# Patient Record
Sex: MALE | Employment: FULL TIME | URBAN - METROPOLITAN AREA
[De-identification: names, ages, dates, MRNs, and addresses within clinical notes are randomized per-mention and may not be internally consistent; named-entity substitution may affect disease eponyms.]

---

## 2023-05-03 ENCOUNTER — HOSPITAL ENCOUNTER (OUTPATIENT)
Dept: GENERAL RADIOLOGY | Facility: HOSPITAL | Age: 43
Discharge: HOME | End: 2023-05-03
Attending: FAMILY MEDICINE
Payer: MEDICAID

## 2023-05-03 ENCOUNTER — LAB ENCOUNTER (OUTPATIENT)
Dept: LAB | Age: 43
End: 2023-05-03
Attending: FAMILY MEDICINE
Payer: MEDICAID

## 2023-05-03 ENCOUNTER — OFFICE VISIT (OUTPATIENT)
Dept: FAMILY MEDICINE CLINIC | Facility: CLINIC | Age: 43
End: 2023-05-03

## 2023-05-03 VITALS
BODY MASS INDEX: 42.1 KG/M2 | WEIGHT: 281 LBS | HEART RATE: 76 BPM | HEIGHT: 68.5 IN | DIASTOLIC BLOOD PRESSURE: 110 MMHG | SYSTOLIC BLOOD PRESSURE: 170 MMHG

## 2023-05-03 DIAGNOSIS — I10 BENIGN ESSENTIAL HTN: Primary | ICD-10-CM

## 2023-05-03 DIAGNOSIS — M25.562 LEFT MEDIAL KNEE PAIN: ICD-10-CM

## 2023-05-03 DIAGNOSIS — Z13.1 DIABETES MELLITUS SCREENING: ICD-10-CM

## 2023-05-03 DIAGNOSIS — I10 BENIGN ESSENTIAL HTN: ICD-10-CM

## 2023-05-03 DIAGNOSIS — Z13.220 LIPID SCREENING: ICD-10-CM

## 2023-05-03 LAB
ALBUMIN SERPL-MCNC: 4 G/DL (ref 3.4–5)
ALBUMIN/GLOB SERPL: 0.9 {RATIO} (ref 1–2)
ALP LIVER SERPL-CCNC: 124 U/L
ALT SERPL-CCNC: 80 U/L
ANION GAP SERPL CALC-SCNC: 6 MMOL/L (ref 0–18)
AST SERPL-CCNC: 42 U/L (ref 15–37)
ATRIAL RATE: 78 BPM
BILIRUB SERPL-MCNC: 0.6 MG/DL (ref 0.1–2)
BUN BLD-MCNC: 10 MG/DL (ref 7–18)
BUN/CREAT SERPL: 13.3 (ref 10–20)
CALCIUM BLD-MCNC: 9 MG/DL (ref 8.5–10.1)
CHLORIDE SERPL-SCNC: 105 MMOL/L (ref 98–112)
CHOLEST SERPL-MCNC: 211 MG/DL (ref ?–200)
CO2 SERPL-SCNC: 28 MMOL/L (ref 21–32)
CREAT BLD-MCNC: 0.75 MG/DL
EST. AVERAGE GLUCOSE BLD GHB EST-MCNC: 123 MG/DL (ref 68–126)
FASTING PATIENT LIPID ANSWER: YES
FASTING STATUS PATIENT QL REPORTED: YES
GFR SERPLBLD BASED ON 1.73 SQ M-ARVRAT: 117 ML/MIN/1.73M2 (ref 60–?)
GLOBULIN PLAS-MCNC: 4.4 G/DL (ref 2.8–4.4)
GLUCOSE BLD-MCNC: 109 MG/DL (ref 70–99)
HBA1C MFR BLD: 5.9 % (ref ?–5.7)
HDLC SERPL-MCNC: 34 MG/DL (ref 40–59)
LDLC SERPL CALC-MCNC: 139 MG/DL (ref ?–100)
NONHDLC SERPL-MCNC: 177 MG/DL (ref ?–130)
OSMOLALITY SERPL CALC.SUM OF ELEC: 288 MOSM/KG (ref 275–295)
P AXIS: 18 DEGREES
P-R INTERVAL: 164 MS
POTASSIUM SERPL-SCNC: 3.6 MMOL/L (ref 3.5–5.1)
PROT SERPL-MCNC: 8.4 G/DL (ref 6.4–8.2)
Q-T INTERVAL: 368 MS
QRS DURATION: 92 MS
QTC CALCULATION (BEZET): 419 MS
R AXIS: 11 DEGREES
SODIUM SERPL-SCNC: 139 MMOL/L (ref 136–145)
T AXIS: 21 DEGREES
TRIGL SERPL-MCNC: 209 MG/DL (ref 30–149)
TSI SER-ACNC: 2.4 MIU/ML (ref 0.36–3.74)
VENTRICULAR RATE: 78 BPM
VLDLC SERPL CALC-MCNC: 39 MG/DL (ref 0–30)

## 2023-05-03 PROCEDURE — 99204 OFFICE O/P NEW MOD 45 MIN: CPT | Performed by: FAMILY MEDICINE

## 2023-05-03 PROCEDURE — 36415 COLL VENOUS BLD VENIPUNCTURE: CPT

## 2023-05-03 PROCEDURE — 84443 ASSAY THYROID STIM HORMONE: CPT

## 2023-05-03 PROCEDURE — 93005 ELECTROCARDIOGRAM TRACING: CPT

## 2023-05-03 PROCEDURE — 3008F BODY MASS INDEX DOCD: CPT | Performed by: FAMILY MEDICINE

## 2023-05-03 PROCEDURE — 3077F SYST BP >= 140 MM HG: CPT | Performed by: FAMILY MEDICINE

## 2023-05-03 PROCEDURE — 83036 HEMOGLOBIN GLYCOSYLATED A1C: CPT

## 2023-05-03 PROCEDURE — 73562 X-RAY EXAM OF KNEE 3: CPT | Performed by: FAMILY MEDICINE

## 2023-05-03 PROCEDURE — 80053 COMPREHEN METABOLIC PANEL: CPT

## 2023-05-03 PROCEDURE — 80061 LIPID PANEL: CPT

## 2023-05-03 PROCEDURE — 93010 ELECTROCARDIOGRAM REPORT: CPT | Performed by: STUDENT IN AN ORGANIZED HEALTH CARE EDUCATION/TRAINING PROGRAM

## 2023-05-03 PROCEDURE — 3080F DIAST BP >= 90 MM HG: CPT | Performed by: FAMILY MEDICINE

## 2023-05-03 PROCEDURE — 81015 MICROSCOPIC EXAM OF URINE: CPT

## 2023-05-03 RX ORDER — LISINOPRIL 10 MG/1
10 TABLET ORAL DAILY
Qty: 90 TABLET | Refills: 3 | Status: SHIPPED | OUTPATIENT
Start: 2023-05-03 | End: 2024-04-27

## 2023-05-03 RX ORDER — BLOOD PRESSURE TEST KIT
KIT MISCELLANEOUS
Qty: 1 KIT | Refills: 0 | Status: SHIPPED | OUTPATIENT
Start: 2023-05-03

## 2023-05-03 NOTE — ASSESSMENT & PLAN NOTE
Pt new pt with no known hx of htn, but given obesity, suspected underlying cpap and elevated bps x 2 d/w pt and wife concern for htn. Pt asymptomatic. D/w them option of lifestyle modifications, checking bps at home and then f/u in 3-5days for reeval vs. Doing all of that + meds. Pt opted for latter. Started on lisinopril 10mg daily  Reviewed with pt importance of decreasing salt intake, and eating more fresh foods, less processed foods. Pt will also need sleep study. Plan to order at future visit.

## 2023-05-06 DIAGNOSIS — R73.02 IGT (IMPAIRED GLUCOSE TOLERANCE): Primary | ICD-10-CM

## 2023-05-06 DIAGNOSIS — R74.01 TRANSAMINITIS: ICD-10-CM

## 2023-05-06 DIAGNOSIS — E78.2 MODERATE MIXED HYPERLIPIDEMIA NOT REQUIRING STATIN THERAPY: ICD-10-CM

## 2023-05-06 DIAGNOSIS — M25.562 LEFT MEDIAL KNEE PAIN: Primary | ICD-10-CM

## 2023-05-09 ENCOUNTER — OFFICE VISIT (OUTPATIENT)
Dept: FAMILY MEDICINE CLINIC | Facility: CLINIC | Age: 43
End: 2023-05-09

## 2023-05-09 VITALS
BODY MASS INDEX: 41.59 KG/M2 | WEIGHT: 277.63 LBS | SYSTOLIC BLOOD PRESSURE: 151 MMHG | HEART RATE: 64 BPM | DIASTOLIC BLOOD PRESSURE: 90 MMHG | HEIGHT: 68.5 IN

## 2023-05-09 DIAGNOSIS — M25.562 LEFT MEDIAL KNEE PAIN: ICD-10-CM

## 2023-05-09 DIAGNOSIS — I10 BENIGN ESSENTIAL HTN: Primary | ICD-10-CM

## 2023-05-09 DIAGNOSIS — R74.01 TRANSAMINITIS: ICD-10-CM

## 2023-05-09 DIAGNOSIS — R73.03 PREDIABETES: ICD-10-CM

## 2023-05-09 PROCEDURE — 3080F DIAST BP >= 90 MM HG: CPT | Performed by: FAMILY MEDICINE

## 2023-05-09 PROCEDURE — 3077F SYST BP >= 140 MM HG: CPT | Performed by: FAMILY MEDICINE

## 2023-05-09 PROCEDURE — 3008F BODY MASS INDEX DOCD: CPT | Performed by: FAMILY MEDICINE

## 2023-05-09 PROCEDURE — 99214 OFFICE O/P EST MOD 30 MIN: CPT | Performed by: FAMILY MEDICINE

## 2023-05-09 RX ORDER — METFORMIN HYDROCHLORIDE 500 MG/1
500 TABLET, EXTENDED RELEASE ORAL
Qty: 90 TABLET | Refills: 0 | Status: SHIPPED | OUTPATIENT
Start: 2023-05-09 | End: 2023-08-07

## 2023-05-09 NOTE — ASSESSMENT & PLAN NOTE
Discussed with patient the importance of dietary modifications. We will continue to monitor. Discussed with patient that with weight loss this is reversible.

## 2023-05-09 NOTE — ASSESSMENT & PLAN NOTE
Reviewed with patient that with an average of 7% weight loss that this will be reversible. Patient to continue to make dietary changes. Patient's wife has diabetes. Recommended that he start eating which she does. Will also refer to diabetic navigator and start on metformin daily.

## 2023-05-09 NOTE — ASSESSMENT & PLAN NOTE
Home BPs appear over the last few days to be at goal.  Patient to follow-up in 2 weeks and to bring home BP cuff at that time to confirm accuracy. We will continue lisinopril 10 mg. Patient encouraged to continue making dietary changes.

## 2023-05-15 ENCOUNTER — TELEPHONE (OUTPATIENT)
Dept: CASE MANAGEMENT | Age: 43
End: 2023-05-15

## 2023-05-15 DIAGNOSIS — Z76.89 ENCOUNTER FOR WEIGHT MANAGEMENT: Primary | ICD-10-CM

## 2023-05-15 DIAGNOSIS — R73.02 IGT (IMPAIRED GLUCOSE TOLERANCE): ICD-10-CM

## 2023-05-15 DIAGNOSIS — E66.01 CLASS 3 SEVERE OBESITY DUE TO EXCESS CALORIES WITH SERIOUS COMORBIDITY AND BODY MASS INDEX (BMI) OF 40.0 TO 44.9 IN ADULT (HCC): ICD-10-CM

## 2023-05-15 NOTE — TELEPHONE ENCOUNTER
Dr. Mya Proctor,     Patient requesting to see Dr. Kaylyn Ojeda for weight management. Appointment 5/15/23    Pended referral please review diagnosis and sign off if you agree. Thank you.   Scott Velez

## 2023-05-22 ENCOUNTER — HOSPITAL ENCOUNTER (OUTPATIENT)
Dept: MRI IMAGING | Age: 43
Discharge: HOME | End: 2023-05-22
Attending: FAMILY MEDICINE
Payer: MEDICAID

## 2023-05-22 DIAGNOSIS — M25.562 LEFT MEDIAL KNEE PAIN: ICD-10-CM

## 2023-05-22 PROCEDURE — 73721 MRI JNT OF LWR EXTRE W/O DYE: CPT | Performed by: FAMILY MEDICINE

## 2023-05-23 DIAGNOSIS — S83.242A ACUTE TEAR OF POSTERIOR ASPECT OF MEDIAL MENISCUS OF LEFT KNEE: ICD-10-CM

## 2023-05-23 DIAGNOSIS — S83.242A ACUTE MEDIAL MENISCUS TEAR OF LEFT KNEE, INITIAL ENCOUNTER: Primary | ICD-10-CM

## 2023-05-24 ENCOUNTER — OFFICE VISIT (OUTPATIENT)
Dept: FAMILY MEDICINE CLINIC | Facility: CLINIC | Age: 43
End: 2023-05-24

## 2023-05-24 ENCOUNTER — HOSPITAL ENCOUNTER (OUTPATIENT)
Dept: ULTRASOUND IMAGING | Age: 43
Discharge: HOME | End: 2023-05-24
Attending: FAMILY MEDICINE
Payer: MEDICAID

## 2023-05-24 ENCOUNTER — TELEPHONE (OUTPATIENT)
Dept: ORTHOPEDICS CLINIC | Facility: CLINIC | Age: 43
End: 2023-05-24

## 2023-05-24 VITALS
HEIGHT: 68.5 IN | SYSTOLIC BLOOD PRESSURE: 130 MMHG | DIASTOLIC BLOOD PRESSURE: 82 MMHG | HEART RATE: 71 BPM | WEIGHT: 274 LBS | BODY MASS INDEX: 41.05 KG/M2

## 2023-05-24 DIAGNOSIS — E66.01 CLASS 3 SEVERE OBESITY DUE TO EXCESS CALORIES WITH SERIOUS COMORBIDITY AND BODY MASS INDEX (BMI) OF 40.0 TO 44.9 IN ADULT (HCC): ICD-10-CM

## 2023-05-24 DIAGNOSIS — R74.01 TRANSAMINITIS: ICD-10-CM

## 2023-05-24 DIAGNOSIS — S83.242D TEAR OF MEDIAL MENISCUS OF LEFT KNEE, CURRENT, UNSPECIFIED TEAR TYPE, SUBSEQUENT ENCOUNTER: ICD-10-CM

## 2023-05-24 DIAGNOSIS — E78.2 MODERATE MIXED HYPERLIPIDEMIA NOT REQUIRING STATIN THERAPY: ICD-10-CM

## 2023-05-24 DIAGNOSIS — R73.02 IGT (IMPAIRED GLUCOSE TOLERANCE): ICD-10-CM

## 2023-05-24 DIAGNOSIS — I10 BENIGN ESSENTIAL HTN: Primary | ICD-10-CM

## 2023-05-24 PROBLEM — E66.813 CLASS 3 SEVERE OBESITY DUE TO EXCESS CALORIES WITH SERIOUS COMORBIDITY AND BODY MASS INDEX (BMI) OF 40.0 TO 44.9 IN ADULT (HCC): Status: ACTIVE | Noted: 2023-05-24

## 2023-05-24 PROBLEM — S83.242A TEAR OF MEDIAL MENISCUS OF LEFT KNEE, CURRENT: Status: ACTIVE | Noted: 2023-05-24

## 2023-05-24 PROBLEM — K76.0 HEPATIC STEATOSIS: Status: ACTIVE | Noted: 2023-05-24

## 2023-05-24 PROCEDURE — 76705 ECHO EXAM OF ABDOMEN: CPT | Performed by: FAMILY MEDICINE

## 2023-05-24 PROCEDURE — 3079F DIAST BP 80-89 MM HG: CPT | Performed by: FAMILY MEDICINE

## 2023-05-24 PROCEDURE — 99214 OFFICE O/P EST MOD 30 MIN: CPT | Performed by: FAMILY MEDICINE

## 2023-05-24 PROCEDURE — 3075F SYST BP GE 130 - 139MM HG: CPT | Performed by: FAMILY MEDICINE

## 2023-05-24 PROCEDURE — 3008F BODY MASS INDEX DOCD: CPT | Performed by: FAMILY MEDICINE

## 2023-05-24 NOTE — ASSESSMENT & PLAN NOTE
Blood pressures are well controlled on lisinopril 10 mg. Patient to continue present management. Home blood pressure cuff checked and not accurate giving higher blood pressure results and then in office cuff. Recommended that patient obtain a new cuff.

## 2023-05-24 NOTE — ASSESSMENT & PLAN NOTE
Over the last 3 weeks patient has lost 7 pounds. Has dramatically changed his diet. Encourage patient to continue lifestyle modifications. We will continue metformin for the next 3 months and then reevaluate A1c and plan to start on Trulicity. May need help with coverage with [employer] form.

## 2023-05-24 NOTE — TELEPHONE ENCOUNTER
Patient has an upcoming appointment for LT knee pain. Patient had diagnostic imaging on 5/3 and 5/22 and can be viewed in Epic. Please review imaging, and advise if further imaging is required. If, so please place RX accordingly.     Future Appointments   Date Time Provider Tylor Solorzano   6/12/2023  8:20 AM Nathanael Paget, MD [facility]   8/23/2023 11:00 AM Danielle Brewer, DO [facility]

## 2023-06-05 ENCOUNTER — OFFICE VISIT (OUTPATIENT)
Dept: ORTHOPEDICS CLINIC | Facility: CLINIC | Age: 43
End: 2023-06-05
Payer: MEDICAID

## 2023-06-05 VITALS — BODY MASS INDEX: 41.05 KG/M2 | HEIGHT: 68.5 IN | WEIGHT: 274 LBS

## 2023-06-05 DIAGNOSIS — M17.12 PRIMARY OSTEOARTHRITIS OF LEFT KNEE: ICD-10-CM

## 2023-06-05 DIAGNOSIS — S83.232A COMPLEX TEAR OF MEDIAL MENISCUS OF LEFT KNEE AS CURRENT INJURY, INITIAL ENCOUNTER: Primary | ICD-10-CM

## 2023-06-05 RX ORDER — TRIAMCINOLONE ACETONIDE 40 MG/ML
40 INJECTION, SUSPENSION INTRA-ARTICULAR; INTRAMUSCULAR ONCE
Status: SHIPPED | OUTPATIENT
Start: 2023-06-05

## 2023-06-12 ENCOUNTER — OFFICE VISIT (OUTPATIENT)
Dept: SURGERY | Facility: CLINIC | Age: 43
End: 2023-06-12
Payer: MEDICAID

## 2023-06-12 VITALS
WEIGHT: 274.5 LBS | DIASTOLIC BLOOD PRESSURE: 84 MMHG | SYSTOLIC BLOOD PRESSURE: 139 MMHG | OXYGEN SATURATION: 97 % | BODY MASS INDEX: 41.6 KG/M2 | HEART RATE: 69 BPM | HEIGHT: 68.1 IN

## 2023-06-12 DIAGNOSIS — R73.03 PRE-DIABETES: ICD-10-CM

## 2023-06-12 DIAGNOSIS — E78.5 DYSLIPIDEMIA: ICD-10-CM

## 2023-06-12 DIAGNOSIS — E66.01 MORBID OBESITY WITH BMI OF 40.0-44.9, ADULT (HCC): ICD-10-CM

## 2023-06-12 DIAGNOSIS — I10 BENIGN ESSENTIAL HTN: Primary | ICD-10-CM

## 2023-06-12 PROCEDURE — 99245 OFF/OP CONSLTJ NEW/EST HI 55: CPT | Performed by: INTERNAL MEDICINE

## 2023-06-12 PROCEDURE — 3075F SYST BP GE 130 - 139MM HG: CPT | Performed by: INTERNAL MEDICINE

## 2023-06-12 PROCEDURE — 3008F BODY MASS INDEX DOCD: CPT | Performed by: INTERNAL MEDICINE

## 2023-06-12 PROCEDURE — 3079F DIAST BP 80-89 MM HG: CPT | Performed by: INTERNAL MEDICINE

## 2023-06-12 RX ORDER — DIETHYLPROPION HYDROCHLORIDE 25 MG/1
1 TABLET ORAL DAILY
Qty: 30 TABLET | Refills: 2 | Status: SHIPPED | OUTPATIENT
Start: 2023-06-12 | End: 2023-07-12

## 2023-06-13 ENCOUNTER — TELEPHONE (OUTPATIENT)
Dept: SURGERY | Facility: CLINIC | Age: 43
End: 2023-06-13

## 2023-06-13 DIAGNOSIS — E66.01 MORBID OBESITY WITH BMI OF 40.0-44.9, ADULT (HCC): ICD-10-CM

## 2023-06-13 RX ORDER — DIETHYLPROPION HYDROCHLORIDE 25 MG/1
1 TABLET ORAL DAILY
Qty: 30 TABLET | Refills: 2 | Status: SHIPPED | OUTPATIENT
Start: 2023-06-13 | End: 2023-07-13

## 2023-07-03 ENCOUNTER — OFFICE VISIT (OUTPATIENT)
Dept: SURGERY | Facility: CLINIC | Age: 43
End: 2023-07-03
Payer: MEDICAID

## 2023-07-03 VITALS
BODY MASS INDEX: 41.04 KG/M2 | DIASTOLIC BLOOD PRESSURE: 76 MMHG | SYSTOLIC BLOOD PRESSURE: 124 MMHG | HEIGHT: 68.1 IN | WEIGHT: 270.81 LBS | OXYGEN SATURATION: 96 % | HEART RATE: 71 BPM

## 2023-07-03 DIAGNOSIS — E66.01 MORBID OBESITY WITH BMI OF 40.0-44.9, ADULT (HCC): ICD-10-CM

## 2023-07-03 DIAGNOSIS — E78.5 DYSLIPIDEMIA: ICD-10-CM

## 2023-07-03 DIAGNOSIS — Z51.81 ENCOUNTER FOR THERAPEUTIC DRUG MONITORING: ICD-10-CM

## 2023-07-03 DIAGNOSIS — R73.03 PRE-DIABETES: ICD-10-CM

## 2023-07-03 DIAGNOSIS — I10 BENIGN ESSENTIAL HTN: Primary | ICD-10-CM

## 2023-07-03 PROCEDURE — 3074F SYST BP LT 130 MM HG: CPT | Performed by: INTERNAL MEDICINE

## 2023-07-03 PROCEDURE — 3008F BODY MASS INDEX DOCD: CPT | Performed by: INTERNAL MEDICINE

## 2023-07-03 PROCEDURE — 3078F DIAST BP <80 MM HG: CPT | Performed by: INTERNAL MEDICINE

## 2023-07-03 PROCEDURE — 99214 OFFICE O/P EST MOD 30 MIN: CPT | Performed by: INTERNAL MEDICINE

## 2023-07-03 RX ORDER — PHENTERMINE HYDROCHLORIDE 15 MG/1
15 CAPSULE ORAL EVERY MORNING
Qty: 90 CAPSULE | Refills: 1 | Status: SHIPPED | OUTPATIENT
Start: 2023-07-03 | End: 2023-10-01

## 2023-08-15 DIAGNOSIS — R73.03 PREDIABETES: ICD-10-CM

## 2023-08-15 RX ORDER — METFORMIN HYDROCHLORIDE 500 MG/1
500 TABLET, EXTENDED RELEASE ORAL
Qty: 90 TABLET | Refills: 1 | Status: SHIPPED | OUTPATIENT
Start: 2023-08-15

## 2023-08-15 NOTE — TELEPHONE ENCOUNTER
Refill passed per [facility] [facility], [facility] protocol.      Requested Prescriptions   Pending Prescriptions Disp Refills    METFORMIN  MG Oral Tablet 24 Hr [Pharmacy Med Name: METFORMIN ER 500MG 24HR TABS] 90 tablet 0     Sig: TAKE 1 TABLET(500 MG) BY MOUTH DAILY WITH BREAKFAST       Diabetes Medication Protocol Passed - 8/15/2023 11:45 AM        Passed - Last A1C < 7.5 and within past 6 months     Lab Results   Component Value Date    A1C 5.9 (H) 05/03/2023             Passed - In person appointment or virtual visit in the past 6 mos or appointment in next 3 mos     Recent Outpatient Visits              1 month ago Benign essential HTN    Tien Maki MD    Office Visit    2 months ago Benign essential HTN    Tien Maki MD    Office Visit    2 months ago Complex tear of medial meniscus of left knee as current injury, initial encounter    [facility], Yevgeniy Branch, Rob epstein MD    Office Visit    2 months ago Benign essential HTN    [address] [facility], [address] [facility] [address], [facility], [place]    Office Visit    3 months ago Benign essential HTN    [address], [address] [place] [address] [address], [facility],     Office Visit          Future Appointments         Provider Department Appt Notes    In 1 week Radha Loo 61, DO [address] 2 month follow up    In 3 months Alba Bruce MD [facility], [address] [facility],[address], [facility] 25 or GFRNAA > 50     GFR Evaluation  EGFRCR: 117 , resulted on 5/3/2023          Passed - GFR in the past 12 months             Recent Outpatient Visits              1 month ago Benign essential HTN    Tien Maki MD    Office Visit    2 months ago Benign essential HTN Nadira Lee MD    Office Visit    2 months ago Complex tear of medial meniscus of left knee as current injury, initial encounter    [facility], [facility], Eliseo Collins MD    Office Visit    2 months ago Benign essential HTN    [address] [facility], [place] Radha Andrews [place]    Office Visit    3 months ago Benign essential HTN    [facility], [address] [facility], [address] [place] [address] [address], Radha Norris, DO    Office Visit            Future Appointments         Provider Department Appt Notes    In 1 week Radha Andrews, DO [facility], [place] 2 month follow up    In 3 months Mary Breeze, MD [facility], [address] [place] [address],[address], [facility]

## 2023-09-06 ENCOUNTER — OFFICE VISIT (OUTPATIENT)
Dept: FAMILY MEDICINE CLINIC | Facility: CLINIC | Age: 43
End: 2023-09-06

## 2023-09-06 VITALS
SYSTOLIC BLOOD PRESSURE: 137 MMHG | DIASTOLIC BLOOD PRESSURE: 83 MMHG | OXYGEN SATURATION: 96 % | BODY MASS INDEX: 38.95 KG/M2 | WEIGHT: 257 LBS | HEART RATE: 70 BPM | HEIGHT: 68 IN

## 2023-09-06 DIAGNOSIS — R74.01 TRANSAMINITIS: ICD-10-CM

## 2023-09-06 DIAGNOSIS — R73.02 IGT (IMPAIRED GLUCOSE TOLERANCE): ICD-10-CM

## 2023-09-06 DIAGNOSIS — I10 BENIGN ESSENTIAL HTN: Primary | ICD-10-CM

## 2023-09-06 DIAGNOSIS — E78.5 DYSLIPIDEMIA: ICD-10-CM

## 2023-09-06 DIAGNOSIS — S83.242D TEAR OF MEDIAL MENISCUS OF LEFT KNEE, CURRENT, UNSPECIFIED TEAR TYPE, SUBSEQUENT ENCOUNTER: ICD-10-CM

## 2023-09-06 DIAGNOSIS — E66.01 CLASS 2 SEVERE OBESITY DUE TO EXCESS CALORIES WITH SERIOUS COMORBIDITY AND BODY MASS INDEX (BMI) OF 39.0 TO 39.9 IN ADULT: ICD-10-CM

## 2023-09-06 PROBLEM — E66.812 CLASS 2 SEVERE OBESITY DUE TO EXCESS CALORIES WITH SERIOUS COMORBIDITY AND BODY MASS INDEX (BMI) OF 39.0 TO 39.9 IN ADULT (HCC): Status: ACTIVE | Noted: 2023-05-24

## 2023-09-06 PROCEDURE — 99214 OFFICE O/P EST MOD 30 MIN: CPT | Performed by: FAMILY MEDICINE

## 2023-09-06 PROCEDURE — 3075F SYST BP GE 130 - 139MM HG: CPT | Performed by: FAMILY MEDICINE

## 2023-09-06 PROCEDURE — 3008F BODY MASS INDEX DOCD: CPT | Performed by: FAMILY MEDICINE

## 2023-09-06 PROCEDURE — 3079F DIAST BP 80-89 MM HG: CPT | Performed by: FAMILY MEDICINE

## 2023-09-06 NOTE — ASSESSMENT & PLAN NOTE
Well-controlled on lisinopril 10 mg. Repeat blood pressure at goal.  To continue present management.

## 2023-09-06 NOTE — ASSESSMENT & PLAN NOTE
Since July has lost 17 pounds. Total body weight loss equals 6%. Discussed with patient to continue weight loss would recommend starting to exercise. Patient previously like to play football and soccer. Recommended patient start doing this.

## 2023-11-01 ENCOUNTER — OFFICE VISIT (OUTPATIENT)
Dept: FAMILY MEDICINE CLINIC | Facility: CLINIC | Age: 43
End: 2023-11-01
Payer: MEDICAID

## 2023-11-01 VITALS
HEIGHT: 68.2 IN | TEMPERATURE: 97 F | DIASTOLIC BLOOD PRESSURE: 87 MMHG | SYSTOLIC BLOOD PRESSURE: 138 MMHG | WEIGHT: 256 LBS | HEART RATE: 72 BPM | BODY MASS INDEX: 38.8 KG/M2 | RESPIRATION RATE: 18 BRPM

## 2023-11-01 DIAGNOSIS — L72.9 INFECTED CYST OF SKIN: Primary | ICD-10-CM

## 2023-11-01 DIAGNOSIS — L08.9 INFECTED CYST OF SKIN: Primary | ICD-10-CM

## 2023-11-01 PROCEDURE — 3075F SYST BP GE 130 - 139MM HG: CPT | Performed by: FAMILY MEDICINE

## 2023-11-01 PROCEDURE — 3008F BODY MASS INDEX DOCD: CPT | Performed by: FAMILY MEDICINE

## 2023-11-01 PROCEDURE — 3079F DIAST BP 80-89 MM HG: CPT | Performed by: FAMILY MEDICINE

## 2023-11-01 PROCEDURE — 99213 OFFICE O/P EST LOW 20 MIN: CPT | Performed by: FAMILY MEDICINE

## 2023-11-01 RX ORDER — AMOXICILLIN AND CLAVULANATE POTASSIUM 875; 125 MG/1; MG/1
1 TABLET, FILM COATED ORAL 2 TIMES DAILY
Qty: 14 TABLET | Refills: 0 | Status: SHIPPED | OUTPATIENT
Start: 2023-11-01 | End: 2023-11-08

## 2023-11-01 RX ORDER — AMOXICILLIN AND CLAVULANATE POTASSIUM 875; 125 MG/1; MG/1
1 TABLET, FILM COATED ORAL 2 TIMES DAILY
Qty: 14 TABLET | Refills: 0 | Status: SHIPPED | OUTPATIENT
Start: 2023-11-01 | End: 2023-11-01

## 2023-11-01 RX ORDER — PHENTERMINE HYDROCHLORIDE 15 MG/1
CAPSULE ORAL
COMMUNITY
Start: 2023-10-10

## 2023-11-01 NOTE — PROGRESS NOTES
Subjective:   Patient ID: Carlo Ortiz is a 41year old male. Pt presents with a lump of the left leg that he tried to pop. Wife put a warm compress and better now after drained. Redness and pain has improved. No fevers. History/Other:   Review of Systems  Current Outpatient Medications   Medication Sig Dispense Refill    Phentermine HCl 15 MG Oral Cap       amoxicillin clavulanate 875-125 MG Oral Tab Take 1 tablet by mouth 2 (two) times daily for 7 days. 14 tablet 0    metFORMIN  MG Oral Tablet 24 Hr TAKE 1 TABLET(500 MG) BY MOUTH DAILY WITH BREAKFAST 90 tablet 1    lisinopril 10 MG Oral Tab Take 1 tablet (10 mg total) by mouth daily. 90 tablet 3    Blood Pressure Does not apply Kit To check daily 1 kit 0     Allergies:No Known Allergies    Objective:   Physical Exam  Constitutional:       Appearance: Normal appearance. Skin:     Comments: Left anterior thigh near groin area. Cyst with erythema around it. No pus or drainage. Non-tender. Already drainage. No sig fluctuance. Assessment & Plan:   Infected cyst of skin of left thigh; already drained but cellulitis around cyst area:  - After discussion with patient/ wife who translated, augmentin as directed; To call if worse or not better; Follow up in one week if not resolved or as needed if worse with general surgery. Information provided and referral generated. No orders of the defined types were placed in this encounter. Meds This Visit:  Requested Prescriptions     Signed Prescriptions Disp Refills    amoxicillin clavulanate 875-125 MG Oral Tab 14 tablet 0     Sig: Take 1 tablet by mouth 2 (two) times daily for 7 days.        Imaging & Referrals:  SURGERY - INTERNAL

## 2023-11-07 ENCOUNTER — LAB ENCOUNTER (OUTPATIENT)
Dept: LAB | Facility: HOSPITAL | Age: 43
End: 2023-11-07
Attending: FAMILY MEDICINE
Payer: MEDICAID

## 2023-11-07 DIAGNOSIS — R73.02 IGT (IMPAIRED GLUCOSE TOLERANCE): ICD-10-CM

## 2023-11-07 DIAGNOSIS — R74.01 TRANSAMINITIS: ICD-10-CM

## 2023-11-07 DIAGNOSIS — R74.8 ELEVATED SERUM ALKALINE PHOSPHATASE LEVEL: Primary | ICD-10-CM

## 2023-11-07 DIAGNOSIS — R74.8 ELEVATED SERUM ALKALINE PHOSPHATASE LEVEL: ICD-10-CM

## 2023-11-07 DIAGNOSIS — E78.5 DYSLIPIDEMIA: ICD-10-CM

## 2023-11-07 LAB
ALBUMIN SERPL-MCNC: 4.6 G/DL (ref 3.2–4.8)
ALBUMIN/GLOB SERPL: 1.4 {RATIO} (ref 1–2)
ALP LIVER SERPL-CCNC: 129 U/L
ALT SERPL-CCNC: 40 U/L
ANION GAP SERPL CALC-SCNC: 7 MMOL/L (ref 0–18)
AST SERPL-CCNC: 24 U/L (ref ?–34)
BILIRUB SERPL-MCNC: 0.6 MG/DL (ref 0.3–1.2)
BUN BLD-MCNC: 10 MG/DL (ref 9–23)
BUN/CREAT SERPL: 12.5 (ref 10–20)
CALCIUM BLD-MCNC: 9.3 MG/DL (ref 8.7–10.4)
CHLORIDE SERPL-SCNC: 102 MMOL/L (ref 98–112)
CHOLEST SERPL-MCNC: 191 MG/DL (ref ?–200)
CO2 SERPL-SCNC: 28 MMOL/L (ref 21–32)
CREAT BLD-MCNC: 0.8 MG/DL
EGFRCR SERPLBLD CKD-EPI 2021: 114 ML/MIN/1.73M2 (ref 60–?)
EST. AVERAGE GLUCOSE BLD GHB EST-MCNC: 120 MG/DL (ref 68–126)
FASTING PATIENT LIPID ANSWER: NO
FASTING STATUS PATIENT QL REPORTED: NO
GGT SERPL-CCNC: 175 U/L
GLOBULIN PLAS-MCNC: 3.4 G/DL (ref 2.8–4.4)
GLUCOSE BLD-MCNC: 88 MG/DL (ref 70–99)
HBA1C MFR BLD: 5.8 % (ref ?–5.7)
HDLC SERPL-MCNC: 30 MG/DL (ref 40–59)
LDLC SERPL CALC-MCNC: 134 MG/DL (ref ?–100)
NONHDLC SERPL-MCNC: 161 MG/DL (ref ?–130)
OSMOLALITY SERPL CALC.SUM OF ELEC: 282 MOSM/KG (ref 275–295)
POTASSIUM SERPL-SCNC: 3.4 MMOL/L (ref 3.5–5.1)
PROT SERPL-MCNC: 8 G/DL (ref 5.7–8.2)
SODIUM SERPL-SCNC: 137 MMOL/L (ref 136–145)
TRIGL SERPL-MCNC: 149 MG/DL (ref 30–149)
VLDLC SERPL CALC-MCNC: 27 MG/DL (ref 0–30)

## 2023-11-07 PROCEDURE — 80061 LIPID PANEL: CPT

## 2023-11-07 PROCEDURE — 80053 COMPREHEN METABOLIC PANEL: CPT

## 2023-11-07 PROCEDURE — 82977 ASSAY OF GGT: CPT

## 2023-11-07 PROCEDURE — 36415 COLL VENOUS BLD VENIPUNCTURE: CPT

## 2023-11-07 PROCEDURE — 83036 HEMOGLOBIN GLYCOSYLATED A1C: CPT

## 2023-11-09 DIAGNOSIS — R74.8 ELEVATED ALKALINE PHOSPHATASE LEVEL: Primary | ICD-10-CM

## 2023-11-13 ENCOUNTER — OFFICE VISIT (OUTPATIENT)
Dept: SURGERY | Facility: CLINIC | Age: 43
End: 2023-11-13
Payer: MEDICAID

## 2023-11-13 VITALS
HEART RATE: 82 BPM | HEIGHT: 68.1 IN | OXYGEN SATURATION: 95 % | SYSTOLIC BLOOD PRESSURE: 141 MMHG | WEIGHT: 256.19 LBS | DIASTOLIC BLOOD PRESSURE: 91 MMHG | BODY MASS INDEX: 38.83 KG/M2

## 2023-11-13 DIAGNOSIS — Z51.81 ENCOUNTER FOR THERAPEUTIC DRUG MONITORING: ICD-10-CM

## 2023-11-13 DIAGNOSIS — E66.9 OBESITY (BMI 30-39.9): ICD-10-CM

## 2023-11-13 DIAGNOSIS — E78.5 DYSLIPIDEMIA: ICD-10-CM

## 2023-11-13 DIAGNOSIS — I10 BENIGN ESSENTIAL HTN: ICD-10-CM

## 2023-11-13 DIAGNOSIS — R73.03 PRE-DIABETES: Primary | ICD-10-CM

## 2023-11-13 PROCEDURE — 3080F DIAST BP >= 90 MM HG: CPT | Performed by: INTERNAL MEDICINE

## 2023-11-13 PROCEDURE — 3077F SYST BP >= 140 MM HG: CPT | Performed by: INTERNAL MEDICINE

## 2023-11-13 PROCEDURE — 99214 OFFICE O/P EST MOD 30 MIN: CPT | Performed by: INTERNAL MEDICINE

## 2023-11-13 PROCEDURE — 3008F BODY MASS INDEX DOCD: CPT | Performed by: INTERNAL MEDICINE

## 2023-11-13 RX ORDER — PHENTERMINE HYDROCHLORIDE 15 MG/1
15 CAPSULE ORAL EVERY MORNING
Qty: 30 CAPSULE | Refills: 3 | Status: SHIPPED | OUTPATIENT
Start: 2023-11-13 | End: 2023-12-13

## 2023-11-29 ENCOUNTER — LAB ENCOUNTER (OUTPATIENT)
Dept: LAB | Age: 43
End: 2023-11-29
Attending: FAMILY MEDICINE
Payer: MEDICAID

## 2023-11-29 ENCOUNTER — OFFICE VISIT (OUTPATIENT)
Dept: FAMILY MEDICINE CLINIC | Facility: CLINIC | Age: 43
End: 2023-11-29
Payer: MEDICAID

## 2023-11-29 VITALS
WEIGHT: 253 LBS | HEIGHT: 68 IN | DIASTOLIC BLOOD PRESSURE: 91 MMHG | BODY MASS INDEX: 38.34 KG/M2 | OXYGEN SATURATION: 97 % | SYSTOLIC BLOOD PRESSURE: 147 MMHG | HEART RATE: 71 BPM

## 2023-11-29 DIAGNOSIS — R73.02 IGT (IMPAIRED GLUCOSE TOLERANCE): Primary | ICD-10-CM

## 2023-11-29 DIAGNOSIS — R39.191 NEED TO IMMEDIATELY RE-VOID: ICD-10-CM

## 2023-11-29 DIAGNOSIS — R74.8 ELEVATED SERUM GGT LEVEL: ICD-10-CM

## 2023-11-29 DIAGNOSIS — I10 BENIGN ESSENTIAL HTN: ICD-10-CM

## 2023-11-29 PROBLEM — E78.5 DYSLIPIDEMIA: Status: RESOLVED | Noted: 2023-06-12 | Resolved: 2023-11-29

## 2023-11-29 PROBLEM — E66.01 CLASS 2 SEVERE OBESITY DUE TO EXCESS CALORIES WITH SERIOUS COMORBIDITY AND BODY MASS INDEX (BMI) OF 38.0 TO 38.9 IN ADULT: Status: ACTIVE | Noted: 2023-05-24

## 2023-11-29 PROBLEM — E66.01 CLASS 2 SEVERE OBESITY DUE TO EXCESS CALORIES WITH SERIOUS COMORBIDITY AND BODY MASS INDEX (BMI) OF 38.0 TO 38.9 IN ADULT  (HCC): Status: ACTIVE | Noted: 2023-05-24

## 2023-11-29 PROBLEM — E66.01 CLASS 2 SEVERE OBESITY DUE TO EXCESS CALORIES WITH SERIOUS COMORBIDITY AND BODY MASS INDEX (BMI) OF 38.0 TO 38.9 IN ADULT (HCC): Status: ACTIVE | Noted: 2023-05-24

## 2023-11-29 PROBLEM — E66.812 CLASS 2 SEVERE OBESITY DUE TO EXCESS CALORIES WITH SERIOUS COMORBIDITY AND BODY MASS INDEX (BMI) OF 38.0 TO 38.9 IN ADULT (HCC): Status: ACTIVE | Noted: 2023-05-24

## 2023-11-29 LAB
APPEARANCE: CLEAR
BILIRUBIN: NEGATIVE
GLUCOSE (URINE DIPSTICK): NEGATIVE MG/DL
KETONES (URINE DIPSTICK): NEGATIVE MG/DL
LEUKOCYTES: NEGATIVE
MULTISTIX LOT#: NORMAL NUMERIC
NITRITE, URINE: NEGATIVE
OCCULT BLOOD: NEGATIVE
PH, URINE: 6 (ref 4.5–8)
PROTEIN (URINE DIPSTICK): NEGATIVE MG/DL
SPECIFIC GRAVITY: 1.02 (ref 1–1.03)
URINE-COLOR: YELLOW
UROBILINOGEN,SEMI-QN: 0.2 MG/DL (ref 0–1.9)

## 2023-11-29 PROCEDURE — 81002 URINALYSIS NONAUTO W/O SCOPE: CPT | Performed by: FAMILY MEDICINE

## 2023-11-29 PROCEDURE — 81015 MICROSCOPIC EXAM OF URINE: CPT

## 2023-11-29 PROCEDURE — 3077F SYST BP >= 140 MM HG: CPT | Performed by: FAMILY MEDICINE

## 2023-11-29 PROCEDURE — 83516 IMMUNOASSAY NONANTIBODY: CPT

## 2023-11-29 PROCEDURE — 87086 URINE CULTURE/COLONY COUNT: CPT

## 2023-11-29 PROCEDURE — 3080F DIAST BP >= 90 MM HG: CPT | Performed by: FAMILY MEDICINE

## 2023-11-29 PROCEDURE — 36415 COLL VENOUS BLD VENIPUNCTURE: CPT

## 2023-11-29 PROCEDURE — 3008F BODY MASS INDEX DOCD: CPT | Performed by: FAMILY MEDICINE

## 2023-11-29 PROCEDURE — 99214 OFFICE O/P EST MOD 30 MIN: CPT | Performed by: FAMILY MEDICINE

## 2023-11-29 NOTE — PATIENT INSTRUCTIONS
Take bps at home daily and bring in log and home cuff to next appt    Increase water intake, decrease soda

## 2023-11-30 NOTE — ASSESSMENT & PLAN NOTE
Above goal, however on repeat improved although still above goal.  Possibly increased due to abdominal pain vs. phentermine. Pt to check home bps and to f/u in 1 week. To bring in cuff at that time to verify accuracy. To continue ace at this time.

## 2023-11-30 NOTE — ASSESSMENT & PLAN NOTE
Will check AMA level to further evaluate.   If elevated will plan on referring to GI for further eval.

## 2023-12-01 DIAGNOSIS — R74.01 TRANSAMINITIS: ICD-10-CM

## 2023-12-01 DIAGNOSIS — R74.8 ELEVATED SERUM GGT LEVEL: Primary | ICD-10-CM

## 2023-12-01 LAB — M2 MITOCHONDRIAL AB: <20 UNITS

## 2023-12-05 ENCOUNTER — OFFICE VISIT (OUTPATIENT)
Dept: FAMILY MEDICINE CLINIC | Facility: CLINIC | Age: 43
End: 2023-12-05
Payer: MEDICAID

## 2023-12-05 VITALS
SYSTOLIC BLOOD PRESSURE: 140 MMHG | WEIGHT: 255 LBS | BODY MASS INDEX: 38.65 KG/M2 | OXYGEN SATURATION: 95 % | DIASTOLIC BLOOD PRESSURE: 82 MMHG | HEIGHT: 68 IN | HEART RATE: 70 BPM

## 2023-12-05 DIAGNOSIS — R74.8 ELEVATED SERUM ALKALINE PHOSPHATASE LEVEL: ICD-10-CM

## 2023-12-05 DIAGNOSIS — I10 BENIGN ESSENTIAL HTN: Primary | ICD-10-CM

## 2023-12-05 DIAGNOSIS — R74.8 ELEVATED SERUM GGT LEVEL: ICD-10-CM

## 2023-12-05 PROBLEM — R73.03 PRE-DIABETES: Status: RESOLVED | Noted: 2023-06-12 | Resolved: 2023-12-05

## 2023-12-05 PROCEDURE — 3077F SYST BP >= 140 MM HG: CPT | Performed by: FAMILY MEDICINE

## 2023-12-05 PROCEDURE — 99214 OFFICE O/P EST MOD 30 MIN: CPT | Performed by: FAMILY MEDICINE

## 2023-12-05 PROCEDURE — 3008F BODY MASS INDEX DOCD: CPT | Performed by: FAMILY MEDICINE

## 2023-12-05 PROCEDURE — 3079F DIAST BP 80-89 MM HG: CPT | Performed by: FAMILY MEDICINE

## 2023-12-05 NOTE — ASSESSMENT & PLAN NOTE
Blood pressure overall at goal.  Home blood pressure was checked and is consistent with an office blood pressure reading. Patient to continue current dose of lisinopril 10 mg. Reviewed with wife and patient to continue to monitor blood pressures intermittently and if consistently above 140/90 will need to come back to us to reevaluate. Patient expressed understanding.

## 2023-12-05 NOTE — ASSESSMENT & PLAN NOTE
Given alk phos is less than 50% elevated will continue to monitor levels. Will plan on repeating in 3 months.

## 2024-01-22 ENCOUNTER — OFFICE VISIT (OUTPATIENT)
Dept: SURGERY | Facility: CLINIC | Age: 44
End: 2024-01-22
Payer: MEDICAID

## 2024-01-22 VITALS
HEIGHT: 68.1 IN | DIASTOLIC BLOOD PRESSURE: 84 MMHG | WEIGHT: 251.19 LBS | BODY MASS INDEX: 38.07 KG/M2 | OXYGEN SATURATION: 95 % | SYSTOLIC BLOOD PRESSURE: 134 MMHG | HEART RATE: 83 BPM

## 2024-01-22 DIAGNOSIS — Z51.81 ENCOUNTER FOR THERAPEUTIC DRUG MONITORING: ICD-10-CM

## 2024-01-22 DIAGNOSIS — E78.5 DYSLIPIDEMIA: ICD-10-CM

## 2024-01-22 DIAGNOSIS — E66.9 OBESITY (BMI 30-39.9): ICD-10-CM

## 2024-01-22 DIAGNOSIS — I10 BENIGN ESSENTIAL HTN: Primary | ICD-10-CM

## 2024-01-22 DIAGNOSIS — R73.03 PRE-DIABETES: ICD-10-CM

## 2024-01-22 RX ORDER — PHENTERMINE HYDROCHLORIDE 30 MG/1
30 CAPSULE ORAL EVERY MORNING
Qty: 30 CAPSULE | Refills: 3 | Status: SHIPPED | OUTPATIENT
Start: 2024-01-22 | End: 2024-02-21

## 2024-01-22 NOTE — PROGRESS NOTES
[facility], [facility], [place]  [address] [facility] [address]  [facility] [address]  Dept: [phone]       Patient:  Guzman Rodriguez  :      1980  MRN:      [id number]    Chief Complaint:    Chief Complaint   Patient presents with    Follow - Up    Weight Management     Weight check        SUBJECTIVE     History of Present Illness:  Guzman is being seen today for a follow-up for non surgical weight loss    Past Medical History:   Past Medical History:   Diagnosis Date    Dyslipidemia     Fatty liver     HTN (hypertension), benign     Morbid obesity with BMI of 40.0-44.9, adult (HCC)     Pre-diabetes         Comorbidities:  SOB/AUGUSTE-Improvement?  yes, Back pain-Improvement?  yes, Joint pain-Improvement?  yes, Hypertension-Improvement?  yes, LEN-Improvement?  yes, and Snoring-Improvement?  yes    OBJECTIVE     Vitals: /84   Pulse 83   Ht 5' 8.1\" (1.73 m)   Wt 251 lb 3.2 oz (113.9 kg)   SpO2 95%   BMI 38.08 kg/m²     Initial weight loss: -05   Total weight loss: -23   Start weight: 274    Wt Readings from Last 3 Encounters:   24 251 lb 3.2 oz (113.9 kg)   23 255 lb (115.7 kg)   23 253 lb (114.8 kg)       Patient Medications:    Current Outpatient Medications   Medication Sig Dispense Refill    Phentermine HCl 30 MG Oral Cap Take 1 capsule (30 mg total) by mouth every morning. 30 capsule 3    metFORMIN  MG Oral Tablet 24 Hr TAKE 1 TABLET(500 MG) BY MOUTH DAILY WITH BREAKFAST 90 tablet 1    lisinopril 10 MG Oral Tab Take 1 tablet (10 mg total) by mouth daily. 90 tablet 3    Blood Pressure Does not apply Kit To check daily 1 kit 0     Allergies:  Patient has no known allergies.     Social History:    Social History     Socioeconomic History    Marital status:      Spouse name: Not on file    Number of children: Not on file    Years of education: Not on file    Highest education level: Not on file   Occupational History    Not on file    Tobacco Use    Smoking status: Never     Passive exposure: Never    Smokeless tobacco: Never   Vaping Use    Vaping Use: Never used   Substance and Sexual Activity    Alcohol use: Yes     Alcohol/week: 21.0 standard drinks of alcohol     Types: 21 Cans of beer per week     Comment: 2-3 beers per day    Drug use: Never    Sexual activity: Not on file   Other Topics Concern    Not on file   Social History Narrative    Not on file     Social Determinants of Health     Financial Resource Strain: Not on file   Food Insecurity: Not on file   Transportation Needs: Not on file   Physical Activity: Not on file   Stress: Not on file   Social Connections: Not on file   Housing Stability: Not on file     Surgical History:  History reviewed. No pertinent surgical history.  Family History:    Family History   Problem Relation Age of Onset    Diabetes Mother     Hypertension Mother     Colon Cancer Neg     Prostate Cancer Neg        Food Journal  Reviewed and Discussed:       Patient has a Food Journal?: yes   Patient is reading nutrition labels?  yes  Average Caloric Intake:     Average CHO Intake: 120  Is patient exercising? no  Type of exercise?     Eating Habits  Patient states the following:  Eats 2 meal(s) per day  Length of time it takes to consume a meal:  20  # of snacks per day: 1 Type of snacks:  fruit  Amount of soda consumption per day:  1  Amount of water (in ounces) per day:  64  Drinking between meals only:  yes  Toughest challenge:  exercise    Nutritional Goals  Limit carbohydrates to 100 gms per day, Eat 100-200 calories within 1 hour of waking , and Eat 3-4 cups of fresh fruits or vegetables daily    Behavior Modifications Reviewed and Discussed  Eat breakfast, Eat 3 meals per day, Plan meals in advance, Read nutrition labels, Drink 64 oz of water per day, Maintain a daily food journal, No drinking 30 minutes before or after meals, Utlize portion control strategies to reduce calorie intake, Identify triggers  for eating and manage cues, and Eat slowly and take 20 to 30 minutes to complete each meal    Exercise Goals Reviewed and Discussed    Needs to ambulate    ROS:    Constitutional: negative  Respiratory: negative  Cardiovascular: negative  Gastrointestinal: negative  Musculoskeletal:negative  Neurological: negative  Behavioral/Psych: negative  Endocrine: negative  All other systems were reviewed and are negative    Physical Exam:   General appearance: alert, appears stated age, cooperative, and morbidly obese  Head: Normocephalic, without obvious abnormality, atraumatic  Back: symmetric, no curvature. ROM normal. No CVA tenderness.  Lungs: clear to auscultation bilaterally  Heart: S1, S2 normal, no murmur, click, rub or gallop, regular rate and rhythm  Abdomen:  firm, obese, non tendder  Extremities: extremities normal, atraumatic, no cyanosis or edema  Pulses: 2+ and symmetric  Skin: Skin color, texture, turgor normal. No rashes or lesions  Neurologic: Grossly normal    ASSESSMENT     HYPERTENSION:  The patient's blood pressure has been well controlled.  he has been checking it as instructed and has remained in relatively good control.    HYPERCHOLESTEROLEMIA:  The patient states that his cholesterol has been well controlled on his current medication.    Lab Results   Component Value Date/Time    CHOLEST 191 11/07/2023 02:32 PM     (H) 11/07/2023 02:32 PM    HDL 30 (L) 11/07/2023 02:32 PM    TRIG 149 11/07/2023 02:32 PM    VLDL 27 11/07/2023 02:32 PM       Encounter Diagnosis(ses):   Encounter Diagnoses   Name Primary?    Benign essential HTN Yes    Pre-diabetes     Dyslipidemia     Encounter for therapeutic drug monitoring     Obesity (BMI 30-39.9)        PLAN   Given the patient's age, degree of obesity and multiple medical problems outlined above, I do believe that bariatric surgery may prove beneficial if the patient is unable to lose at least 20% of his weight after 6 months time. Further consideration  for bariatric surgery will be discussed at upcoming clinic visits.        HYPERTENSION: Blood pressure stable on the above medications. No interval change in antihypertensive medication.     DYSLIPIDEMIA: Stable on the above prescribed meal plan and medication. Liver function stable.    Lab Results   Component Value Date/Time    CHOLEST 191 11/07/2023 02:32 PM     (H) 11/07/2023 02:32 PM    HDL 30 (L) 11/07/2023 02:32 PM    TRIG 149 11/07/2023 02:32 PM    VLDL 27 11/07/2023 02:32 PM       Goals for next month:  1. Keep a food log.  2. Drink 48-64 ounces of non-caloric beverages per day. No fruit juices or regular soda.  3. Increase activity-upper body exercises, walk 10 minutes per day.  4. Increase fruit and vegetable servings to 5-6 per day.      Tolerating Phentermine  Increase dose to 30 mg  EKG done    Has good support from wife    Needs to ambulate    Diagnoses and all orders for this visit:    Benign essential HTN    Pre-diabetes    Dyslipidemia    Encounter for therapeutic drug monitoring    Obesity (BMI 30-39.9)  -     Phentermine HCl 30 MG Oral Cap; Take 1 capsule (30 mg total) by mouth every morning.          Olvin Ro MD

## 2024-02-06 DIAGNOSIS — R73.03 PREDIABETES: ICD-10-CM

## 2024-02-07 RX ORDER — METFORMIN HYDROCHLORIDE 500 MG/1
500 TABLET, EXTENDED RELEASE ORAL
Qty: 90 TABLET | Refills: 3 | Status: SHIPPED | OUTPATIENT
Start: 2024-02-07

## 2024-02-07 NOTE — TELEPHONE ENCOUNTER
Refill passed per [facility] protocol.    Requested Prescriptions   Pending Prescriptions Disp Refills    metFORMIN  MG Oral Tablet 24 Hr 90 tablet 1     Sig: Take 1 tablet (500 mg total) by mouth daily with breakfast.       Diabetes Medication Protocol Passed - 2/6/2024  9:09 AM        Passed - Last A1C < 7.5 and within past 6 months     Lab Results   Component Value Date    A1C 5.8 (H) 11/07/2023             Passed - In person appointment or virtual visit in the past 6 mos or appointment in next 3 mos     Recent Outpatient Visits              2 weeks ago Benign essential HTN    [facility] Olvin Ro MD    Office Visit    2 months ago [facility]    [facility] Visit    2 months ago IGT (impaired glucose tolerance)    [facility] Mary Bray DO    Office Visit    2 months ago Pre-diabetes    [facility] Olvin Ro MD    Office Visit    3 months ago Infected cyst of skin    [facility] Ahmet Benoit MD    Office Visit          Future Appointments         Provider Department Appt Notes    In 4 weeks Minna Mix MD [facility]    In 2 months Olvin Ro MD [facility]                Passed - Microalbumin procedure in past 12 months or taking ACE/ARB        Passed - EGFRCR or GFRNAA > 50     GFR Evaluation  EGFRCR: 114 , resulted on 11/7/2023          Passed - GFR in the past 12 months           Future Appointments         Provider Department Appt Notes    In 4 weeks Minna Mix MD [facility]    In 2 months Olvin Ro MD [facility]  [place]           Recent Outpatient Visits              2 weeks ago Benign essential HTN    [facility], Olvin Blanchard MD    Office Visit    2 months ago Benign essential HTN    [facility], Mary Boyd,     Office Visit    2 months ago IGT (impaired glucose tolerance)    [facility], [facility], Mary Boyd,     Office Visit    2 months ago Pre-diabetes    [facility], Olvin Blanchard MD    Office Visit    3 months ago Infected cyst of skin    [facility], Ahmet Torres MD    Office Visit

## 2024-02-27 ENCOUNTER — LAB ENCOUNTER (OUTPATIENT)
Dept: LAB | Facility: HOSPITAL | Age: 44
End: 2024-02-27
Attending: FAMILY MEDICINE
Payer: MEDICAID

## 2024-02-27 DIAGNOSIS — I10 BENIGN ESSENTIAL HTN: ICD-10-CM

## 2024-02-27 DIAGNOSIS — R74.8 ELEVATED SERUM ALKALINE PHOSPHATASE LEVEL: ICD-10-CM

## 2024-02-27 LAB
ALBUMIN SERPL-MCNC: 4.7 G/DL (ref 3.2–4.8)
ALBUMIN/GLOB SERPL: 1.6 {RATIO} (ref 1–2)
ALP LIVER SERPL-CCNC: 107 U/L
ALT SERPL-CCNC: 34 U/L
ANION GAP SERPL CALC-SCNC: 5 MMOL/L (ref 0–18)
AST SERPL-CCNC: 30 U/L (ref ?–34)
BILIRUB SERPL-MCNC: 0.7 MG/DL (ref 0.3–1.2)
BUN BLD-MCNC: 7 MG/DL (ref 9–23)
BUN/CREAT SERPL: 10 (ref 10–20)
CALCIUM BLD-MCNC: 9.3 MG/DL (ref 8.7–10.4)
CHLORIDE SERPL-SCNC: 109 MMOL/L (ref 98–112)
CO2 SERPL-SCNC: 26 MMOL/L (ref 21–32)
CREAT BLD-MCNC: 0.7 MG/DL
EGFRCR SERPLBLD CKD-EPI 2021: 119 ML/MIN/1.73M2 (ref 60–?)
FASTING STATUS PATIENT QL REPORTED: YES
GLOBULIN PLAS-MCNC: 3 G/DL (ref 2.8–4.4)
GLUCOSE BLD-MCNC: 98 MG/DL (ref 70–99)
OSMOLALITY SERPL CALC.SUM OF ELEC: 288 MOSM/KG (ref 275–295)
POTASSIUM SERPL-SCNC: 3.9 MMOL/L (ref 3.5–5.1)
PROT SERPL-MCNC: 7.7 G/DL (ref 5.7–8.2)
SODIUM SERPL-SCNC: 140 MMOL/L (ref 136–145)

## 2024-02-27 PROCEDURE — 80053 COMPREHEN METABOLIC PANEL: CPT

## 2024-02-27 PROCEDURE — 36415 COLL VENOUS BLD VENIPUNCTURE: CPT

## 2024-03-06 ENCOUNTER — LAB ENCOUNTER (OUTPATIENT)
Dept: LAB | Age: 44
End: 2024-03-06
Attending: STUDENT IN AN ORGANIZED HEALTH CARE EDUCATION/TRAINING PROGRAM
Payer: MEDICAID

## 2024-03-06 ENCOUNTER — OFFICE VISIT (OUTPATIENT)
Dept: FAMILY MEDICINE CLINIC | Facility: CLINIC | Age: 44
End: 2024-03-06
Payer: MEDICAID

## 2024-03-06 VITALS
SYSTOLIC BLOOD PRESSURE: 146 MMHG | OXYGEN SATURATION: 97 % | TEMPERATURE: 98 F | HEART RATE: 71 BPM | WEIGHT: 251 LBS | DIASTOLIC BLOOD PRESSURE: 88 MMHG | HEIGHT: 68 IN | BODY MASS INDEX: 38.04 KG/M2

## 2024-03-06 DIAGNOSIS — Z00.00 WELL ADULT EXAM: ICD-10-CM

## 2024-03-06 DIAGNOSIS — S83.242D TEAR OF MEDIAL MENISCUS OF LEFT KNEE, CURRENT, UNSPECIFIED TEAR TYPE, SUBSEQUENT ENCOUNTER: ICD-10-CM

## 2024-03-06 DIAGNOSIS — I10 BENIGN ESSENTIAL HTN: ICD-10-CM

## 2024-03-06 DIAGNOSIS — Z00.00 WELL ADULT EXAM: Primary | ICD-10-CM

## 2024-03-06 LAB
CHOLEST SERPL-MCNC: 185 MG/DL (ref ?–200)
DEPRECATED RDW RBC AUTO: 42.2 FL (ref 35.1–46.3)
ERYTHROCYTE [DISTWIDTH] IN BLOOD BY AUTOMATED COUNT: 13.7 % (ref 11–15)
EST. AVERAGE GLUCOSE BLD GHB EST-MCNC: 111 MG/DL (ref 68–126)
FASTING PATIENT LIPID ANSWER: YES
HBA1C MFR BLD: 5.5 % (ref ?–5.7)
HCT VFR BLD AUTO: 42 %
HDLC SERPL-MCNC: 34 MG/DL (ref 40–59)
HGB BLD-MCNC: 14.1 G/DL
LDLC SERPL CALC-MCNC: 130 MG/DL (ref ?–100)
MCH RBC QN AUTO: 28.4 PG (ref 26–34)
MCHC RBC AUTO-ENTMCNC: 33.6 G/DL (ref 31–37)
MCV RBC AUTO: 84.5 FL
NONHDLC SERPL-MCNC: 151 MG/DL (ref ?–130)
PLATELET # BLD AUTO: 250 10(3)UL (ref 150–450)
RBC # BLD AUTO: 4.97 X10(6)UL
TRIGL SERPL-MCNC: 115 MG/DL (ref 30–149)
TSI SER-ACNC: 1.94 MIU/ML (ref 0.55–4.78)
VLDLC SERPL CALC-MCNC: 21 MG/DL (ref 0–30)
WBC # BLD AUTO: 6.8 X10(3) UL (ref 4–11)

## 2024-03-06 PROCEDURE — 99396 PREV VISIT EST AGE 40-64: CPT | Performed by: STUDENT IN AN ORGANIZED HEALTH CARE EDUCATION/TRAINING PROGRAM

## 2024-03-06 PROCEDURE — 80061 LIPID PANEL: CPT

## 2024-03-06 PROCEDURE — 84443 ASSAY THYROID STIM HORMONE: CPT

## 2024-03-06 PROCEDURE — 85027 COMPLETE CBC AUTOMATED: CPT

## 2024-03-06 PROCEDURE — 36415 COLL VENOUS BLD VENIPUNCTURE: CPT

## 2024-03-06 PROCEDURE — 83036 HEMOGLOBIN GLYCOSYLATED A1C: CPT

## 2024-03-06 RX ORDER — LISINOPRIL 20 MG/1
20 TABLET ORAL DAILY
Qty: 90 TABLET | Refills: 3 | Status: SHIPPED | OUTPATIENT
Start: 2024-03-06 | End: 2025-03-01

## 2024-03-06 NOTE — PROGRESS NOTES
HPI:    Patient ID: Guzman Rodriguez is a 43 year old male.    HPI  Pt presenting for routine physical exam. Wife present for visit, providing Vincentian translation assistance. Denies any acute issues or recent illnesses. No significant chronic medical problems. Past medical/surgical history, family history, and social history were reviewed.     H/o HTN  Home BP readings 130-140s SBP  Taking Lisinopril 10mg  Follows with Bariatrics, recently increased to phentermine 30mg  Denies any palpitations, chest pain, leg swelling, headaches    H/o chronic Left knee pain  S/p injection per Dr. Ngo 6/2023  Reports recent recurrence of pain    Mood stable, denies SH/SI/HI.    Review of Systems   A comprehensive 10 point review of systems was completed.  Pertinent positives and negatives noted in the the HPI.       Current Outpatient Medications   Medication Sig Dispense Refill    lisinopril 20 MG Oral Tab Take 1 tablet (20 mg total) by mouth daily. 90 tablet 3    metFORMIN  MG Oral Tablet 24 Hr Take 1 tablet (500 mg total) by mouth daily with breakfast. 90 tablet 3    Blood Pressure Does not apply Kit To check daily 1 kit 0     Allergies:No Known Allergies   Vitals:    03/06/24 1115   BP: 146/88   Pulse: 71   Temp: 97.8 °F (36.6 °C)   TempSrc: Temporal   SpO2: 97%   Weight: 251 lb (113.9 kg)   Height: 5' 8\" (1.727 m)       Body mass index is 38.16 kg/m².   PHYSICAL EXAM:   Physical Exam  Vitals reviewed.   Constitutional:       General: He is not in acute distress.     Appearance: Normal appearance.   HENT:      Head: Normocephalic and atraumatic.      Right Ear: Tympanic membrane, ear canal and external ear normal.      Left Ear: Tympanic membrane, ear canal and external ear normal.   Eyes:      Conjunctiva/sclera: Conjunctivae normal.   Cardiovascular:      Rate and Rhythm: Normal rate and regular rhythm.      Heart sounds: Normal heart sounds, S1 normal and S2 normal. No murmur heard.  Pulmonary:      Effort: Pulmonary  effort is normal. No respiratory distress.      Breath sounds: Normal breath sounds. No wheezing, rhonchi or rales.   Abdominal:      General: Bowel sounds are normal.      Palpations: Abdomen is soft.      Tenderness: There is no abdominal tenderness. There is no guarding or rebound.   Musculoskeletal:      Cervical back: Normal range of motion and neck supple. No muscular tenderness.      Right lower leg: No edema.      Left lower leg: No edema.   Lymphadenopathy:      Cervical: No cervical adenopathy.   Skin:     General: Skin is warm.      Coloration: Skin is not jaundiced.   Neurological:      General: No focal deficit present.      Mental Status: He is alert and oriented to person, place, and time. Mental status is at baseline.   Psychiatric:         Attention and Perception: Attention normal.         Mood and Affect: Mood normal.         Behavior: Behavior normal. Behavior is cooperative.         Cognition and Memory: Cognition normal.              ASSESSMENT/PLAN:   1. Well adult exam  Discussed preventative screenings  - recent labs reviewed -- will check labs as below  - encouraged to continue diet/exercise for overall wellness  - follow-up with eye doctor annually  - follow-up with dentist every 6 months  - return yearly for physicals  - annual flu shot  - tetanus booster every 10yrs  - TSH W Reflex To Free T4; Future  - CBC, Platelet; No Differential; Future  - Hemoglobin A1C; Future  - Lipid Panel; Future    2. Tear of medial meniscus of left knee, current, unspecified tear type, subsequent encounter  Follow-up with Ortho for continued management  - Ortho Referral - In Network    3. Benign essential HTN  In-office BP elevated, pt otherwise asymptomatic  - discussed benefits of DASH diet and daily activity  - continue BP monitoring  - continue daily medication -- will increase Lisinopril to 20mg daily  - will check labwork  - advised to call if BP is persistently elevated  - lisinopril 20 MG Oral Tab;  Take 1 tablet (20 mg total) by mouth daily.  Dispense: 90 tablet; Refill: 3    Pt verbalized understanding and agrees with plan.    Orders Placed This Encounter   Procedures    TSH W Reflex To Free T4    CBC, Platelet; No Differential    Hemoglobin A1C    Lipid Panel       Meds This Visit:  Requested Prescriptions     Signed Prescriptions Disp Refills    lisinopril 20 MG Oral Tab 90 tablet 3     Sig: Take 1 tablet (20 mg total) by mouth daily.       Imaging & Referrals:  ORTHOPEDIC - INTERNAL         ID#[id number]

## 2024-04-01 ENCOUNTER — OFFICE VISIT (OUTPATIENT)
Dept: ORTHOPEDICS CLINIC | Facility: CLINIC | Age: 44
End: 2024-04-01
Payer: MEDICAID

## 2024-04-01 VITALS — HEIGHT: 68 IN | BODY MASS INDEX: 38.04 KG/M2 | WEIGHT: 251 LBS

## 2024-04-01 DIAGNOSIS — M17.12 PRIMARY OSTEOARTHRITIS OF LEFT KNEE: ICD-10-CM

## 2024-04-01 DIAGNOSIS — S83.232D COMPLEX TEAR OF MEDIAL MENISCUS OF LEFT KNEE AS CURRENT INJURY, SUBSEQUENT ENCOUNTER: Primary | ICD-10-CM

## 2024-04-01 RX ORDER — TRIAMCINOLONE ACETONIDE 40 MG/ML
40 INJECTION, SUSPENSION INTRA-ARTICULAR; INTRAMUSCULAR ONCE
Status: SHIPPED | OUTPATIENT
Start: 2024-04-01

## 2024-04-01 NOTE — PROGRESS NOTES
[facility] follow-up    Chief Complaint:  Chronic left knee pain    History: The patient is a 43 year old male presenting with his wife for reassessment of his left knee due to recurrent pain and stiffness.  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  Pain is limited to the medial aspect of his left knee with mild stiffness but no instability.  The patient therefore underwent an MRI which identified abnormality in the medial meniscus and significant arthritic change.  Corticosteroid injection provided in June of last year was highly beneficial.  Symptoms have now returned with prolonged standing and walking and pivoting activities.  He and his wife express a desire to proceed with intervention for an upcoming visit to [place] in July.    Past Medical History:   Diagnosis Date    Dyslipidemia     Fatty liver     HTN (hypertension), benign     Morbid obesity with BMI of 40.0-44.9, adult (HCC)     Pre-diabetes      History reviewed. No pertinent surgical history.  Current Outpatient Medications   Medication Sig Dispense Refill    lisinopril 20 MG Oral Tab Take 1 tablet (20 mg total) by mouth daily. 90 tablet 3    metFORMIN  MG Oral Tablet 24 Hr Take 1 tablet (500 mg total) by mouth daily with breakfast. 90 tablet 3    Blood Pressure Does not apply Kit To check daily 1 kit 0     No Known Allergies  Family History   Problem Relation Age of Onset    Diabetes Mother     Hypertension Mother     Colon Cancer Neg     Prostate Cancer Neg      Social History     Occupational History    Not on file   Tobacco Use    Smoking status: Never     Passive exposure: Never    Smokeless tobacco: Never   Vaping Use    Vaping Use: Never used   Substance and Sexual Activity    Alcohol use: Yes     Alcohol/week: 21.0 standard drinks of alcohol     Types: 21 Cans of beer per week      Comment: 2-3 beers per day    Drug use: Never    Sexual activity: Not on file        ROS:  Complete ROS reviewed by me and non-contributory to the chief complaint except as mentioned above.          Physical Exam:    Ht 5' 8\" (1.727 m)   Wt 251 lb (113.9 kg)   BMI 38.16 kg/m²   Constitutional: Well developed, well nourished 43 year old male  Psychological: NAD, alert and appropriate  Respiratory: Breathing comfortably on room air with RR of 10-14  Cardiac: Palpable distal pulses with pink warm extremities  Left knee exam:  On examination there is trace swelling and no discoloration about the knee.  There is distant medial joint line tenderness with a painful Fior's test.  Lateral joint line is less tender.  Knee ligaments are stable on varus valgus stress with solid endpoints.  Lachman and posterior drawer are intact.  Range of motion testing demonstrates discomfort on passive hyperflexion.  There is no distal edema about the foot and ankle with intact motor, sensory and perfusion.        Imaging Results:      MRI KNEE, LEFT ([id number])    Result Date: 5/23/2023  CONCLUSION:  1. Complex degenerative tear posterior horn and body of medial meniscus with a radial component at the posterior horn root.  Loss of meniscal hoop tension. 2. Stress related subchondral marrow edema in the medial tibial plateau.  No discrete fracture. 3. Mild-to-moderate irregular articular cartilage thinning medial tibial plateau. 4. Small knee joint effusion with reactive synovitis.     Dictated by (CST): Willis Cabral MD on 5/23/2023 at 12:03 PM     Finalized by (CST): Willis Cabral MD on 5/23/2023 at 12:12 PM      [facility]    MRI and plain x-rays from 5/3/2023 left knee personally viewed, independently interpreted and radiology report read.    Aside from the above there is some trace arthritic narrowing at the medial aspect of this left knee.          Assessment: Diagnoses and all orders for this visit:    Diagnoses and all  orders for this visit:    Complex tear of medial meniscus of left knee as current injury, subsequent encounter  -     DRAIN/INJECT LARGE JOINT/BURSA  -     triamcinolone acetonide (Kenalog-40) 40 MG/ML injection 40 mg    Primary osteoarthritis of left knee  -     DRAIN/INJECT LARGE JOINT/BURSA  -     triamcinolone acetonide (Kenalog-40) 40 MG/ML injection 40 mg      Plan: We reviewed imaging and exam findings with the patient.  Previous MRI correlates with physical exam identifying a meniscus tear which remains symptomatic.  Unfortunately he also has associated medial compartment joint space narrowing suggestive of early osteoarthritis. We had a long discussion regarding the nature of this diagnosis and treatment options.  This includes continued conservative care with activity protection, repeat injections versus minimally invasive management with arthroscopy.  Again, he has plans to visit [place] in July and is hoping for symptomatic relief in preparation for his travels.  I told him frankly that arthroscopy may be the more definitive treatment option given his MRI findings despite his arthritic changes.  Given that he works on his feet he would likely be off of work at the restaurant for 6 to 8 weeks depending on his pace of recovery and postoperative swelling.  Risk, benefits and alternatives to surgery were discussed including but not limited to possible infection, bleeding, neurovascular injury as well as postop stiffness, continued pain and failed improvement following surgery.  Prognosis is closely tied to the degree of arthritis present. Risks of anesthesia were also reviewed including but not limited to possible cardiac, pulmonary, or cerebrovascular complications, any of which could be life-threatening.  Pre-operative medical clearance and optimization for anesthesia will be required.  The patient and his wife verbalized understanding and agreement and was advised to contact our office for surgical  scheduling if desired.  Now, he is requesting a repeat corticosteroid injection hopeful for relief without surgical intervention.  All questions were answered and patient verbalized understanding and appreciation.    Knee Cortisone Injection Procedure:  After discussing risks, benefits and alternatives to cortisone injection including but not limited to needle infection, steroid flare, temporary elevation in blood sugars in diabetic patients or failed improvement, the patient gave written consent to proceed.  Using meticulous sterile technique, we injected 40 mg of Kenalog mixed with 4 cc of 1% Xylocaine at the lateral patellofemoral joint of the left knee to minimal resistance.  The patient tolerated this well and a Band-Aid was applied.  Instructions were given to contact us if the patient experiences any adverse effects.      Claudine Ngo MD, FAAOS  Sports Medicine/Knee and Shoulder  [facility]  Phone [phone]  Fax [phone]      This document was partially prepared using Dragon Medical voice recognition software.  Although every attempt is made to correct errors during dictation, discrepancies may still exist.

## 2024-04-15 ENCOUNTER — OFFICE VISIT (OUTPATIENT)
Dept: SURGERY | Facility: CLINIC | Age: 44
End: 2024-04-15
Payer: MEDICAID

## 2024-04-15 VITALS
OXYGEN SATURATION: 97 % | HEART RATE: 70 BPM | SYSTOLIC BLOOD PRESSURE: 132 MMHG | DIASTOLIC BLOOD PRESSURE: 82 MMHG | WEIGHT: 247.38 LBS | HEIGHT: 68.1 IN | BODY MASS INDEX: 37.49 KG/M2

## 2024-04-15 DIAGNOSIS — Z51.81 ENCOUNTER FOR THERAPEUTIC DRUG MONITORING: ICD-10-CM

## 2024-04-15 DIAGNOSIS — R73.03 PRE-DIABETES: ICD-10-CM

## 2024-04-15 DIAGNOSIS — I10 BENIGN ESSENTIAL HTN: Primary | ICD-10-CM

## 2024-04-15 DIAGNOSIS — E66.9 OBESITY (BMI 30-39.9): ICD-10-CM

## 2024-04-15 PROCEDURE — 99215 OFFICE O/P EST HI 40 MIN: CPT | Performed by: INTERNAL MEDICINE

## 2024-04-15 RX ORDER — PHENTERMINE HYDROCHLORIDE 30 MG/1
30 CAPSULE ORAL EVERY MORNING
Qty: 30 CAPSULE | Refills: 5 | Status: SHIPPED | OUTPATIENT
Start: 2024-04-15 | End: 2024-05-15

## 2024-04-15 NOTE — PROGRESS NOTES
[facility], [facility], [place]  [address] [facility] [address] [place] [id number]  Dept: [phone]       Patient:  Guzman Rodriguez  :      1980  MRN:      [id number]    Chief Complaint:    Chief Complaint   Patient presents with    Follow - Up    Weight Management     Weight check        SUBJECTIVE     History of Present Illness:  Guzman is being seen today for a follow-up for non surgical weight loss    Past Medical History:   Past Medical History:    Dyslipidemia    Fatty liver    HTN (hypertension), benign    Morbid obesity with BMI of 40.0-44.9, adult (HCC)    Pre-diabetes        Comorbidities:  SOB/AUGUSTE-Improvement?  yes, Back pain-Improvement?  yes, Joint pain-Improvement?  yes, Hypertension-Improvement?  yes, LEN-Improvement?  yes, and Snoring-Improvement?  yes    OBJECTIVE     Vitals: /82   Pulse 70   Ht 5' 8.1\" (1.73 m)   Wt 247 lb 6.4 oz (112.2 kg)   SpO2 97%   BMI 37.51 kg/m²     Initial weight loss: -04   Total weight loss: -27   Start weight: 274    Wt Readings from Last 3 Encounters:   04/15/24 247 lb 6.4 oz (112.2 kg)   24 251 lb (113.9 kg)   24 251 lb (113.9 kg)       Patient Medications:    Current Outpatient Medications   Medication Sig Dispense Refill    lisinopril 20 MG Oral Tab Take 1 tablet (20 mg total) by mouth daily. 90 tablet 3    metFORMIN  MG Oral Tablet 24 Hr Take 1 tablet (500 mg total) by mouth daily with breakfast. 90 tablet 3    Blood Pressure Does not apply Kit To check daily 1 kit 0     Allergies:  Patient has no known allergies.     Social History:    Social History     Socioeconomic History    Marital status:      Spouse name: Not on file    Number of children: Not on file    Years of education: Not on file    Highest education level: Not on file   Occupational History    Not on file   Tobacco Use    Smoking status: Never     Passive exposure: Never    Smokeless tobacco: Never   Vaping Use    Vaping  status: Never Used   Substance and Sexual Activity    Alcohol use: Yes     Alcohol/week: 21.0 standard drinks of alcohol     Types: 21 Cans of beer per week     Comment: 2-3 beers per day    Drug use: Never    Sexual activity: Not on file   Other Topics Concern    Not on file   Social History Narrative    Not on file     Social Determinants of Health     Financial Resource Strain: Not on file   Food Insecurity: Not on file   Transportation Needs: Not on file   Physical Activity: Not on file   Stress: Not on file   Social Connections: Not on file   Housing Stability: Not on file     Surgical History:  No past surgical history on file.  Family History:    Family History   Problem Relation Age of Onset    Diabetes Mother     Hypertension Mother     Colon Cancer Neg     Prostate Cancer Neg        Food Journal  Reviewed and Discussed:       Patient has a Food Journal?: yes   Patient is reading nutrition labels?  yes  Average Caloric Intake:     Average CHO Intake: 120  Is patient exercising? no  Type of exercise?     Eating Habits  Patient states the following:  Eats 2 meal(s) per day  Length of time it takes to consume a meal:  20  # of snacks per day: 1 Type of snacks:  fruit  Amount of soda consumption per day:  1  Amount of water (in ounces) per day:  64  Drinking between meals only:  yes  Toughest challenge:  exercise    Nutritional Goals  Limit carbohydrates to 100 gms per day, Eat 100-200 calories within 1 hour of waking , and Eat 3-4 cups of fresh fruits or vegetables daily    Behavior Modifications Reviewed and Discussed  Eat breakfast, Eat 3 meals per day, Plan meals in advance, Read nutrition labels, Drink 64 oz of water per day, Maintain a daily food journal, No drinking 30 minutes before or after meals, Utlize portion control strategies to reduce calorie intake, Identify triggers for eating and manage cues, and Eat slowly and take 20 to 30 minutes to complete each meal    Exercise Goals Reviewed and  Discussed    Needs to ambulate    ROS:    Constitutional: negative  Respiratory: negative  Cardiovascular: negative  Gastrointestinal: negative  Musculoskeletal:negative  Neurological: negative  Behavioral/Psych: negative  Endocrine: negative  All other systems were reviewed and are negative    Physical Exam:   General appearance: alert, appears stated age, cooperative, and morbidly obese  Head: Normocephalic, without obvious abnormality, atraumatic  Back: symmetric, no curvature. ROM normal. No CVA tenderness.  Lungs: clear to auscultation bilaterally  Heart: S1, S2 normal, no murmur, click, rub or gallop, regular rate and rhythm  Abdomen:  firm, obese, non tendder  Extremities: extremities normal, atraumatic, no cyanosis or edema  Pulses: 2+ and symmetric  Skin: Skin color, texture, turgor normal. No rashes or lesions  Neurologic: Grossly normal    ASSESSMENT     HYPERTENSION:  The patient's blood pressure has been well controlled.  he has been checking it as instructed and has remained in relatively good control.    HYPERCHOLESTEROLEMIA:  The patient states that his cholesterol has been well controlled on his current medication.    Lab Results   Component Value Date/Time    CHOLEST 185 03/06/2024 11:47 AM     (H) 03/06/2024 11:47 AM    HDL 34 (L) 03/06/2024 11:47 AM    TRIG 115 03/06/2024 11:47 AM    VLDL 21 03/06/2024 11:47 AM       Encounter Diagnosis(ses):   Encounter Diagnoses   Name Primary?    Benign essential HTN Yes    Pre-diabetes     Encounter for therapeutic drug monitoring     Obesity (BMI 30-39.9)        PLAN   Given the patient's age, degree of obesity and multiple medical problems outlined above, I do believe that bariatric surgery may prove beneficial if the patient is unable to lose at least 20% of his weight after 6 months time. Further consideration for bariatric surgery will be discussed at upcoming clinic visits.        HYPERTENSION: Blood pressure stable on the above medications. No  interval change in antihypertensive medication.     DYSLIPIDEMIA: Stable on the above prescribed meal plan and medication. Liver function stable.    Lab Results   Component Value Date/Time    CHOLEST 185 03/06/2024 11:47 AM     (H) 03/06/2024 11:47 AM    HDL 34 (L) 03/06/2024 11:47 AM    TRIG 115 03/06/2024 11:47 AM    VLDL 21 03/06/2024 11:47 AM       Goals for next month:  1. Keep a food log.  2. Drink 48-64 ounces of non-caloric beverages per day. No fruit juices or regular soda.  3. Increase activity-upper body exercises, walk 10 minutes per day.  4. Increase fruit and vegetable servings to 5-6 per day.      Tolerating Phentermine  Refill at current dose  EKG done    Has good support from wife    Needs to ambulate    Compound semaglutide is a custom-made medication that mimics the GLP-1 hormone. It is used to treat type 2 diabetes and lower the risk of heart or blood vessel disease. It works by increasing insulin release, lowering glucagon release, delaying gastric emptying and reducing appetite. Compound semaglutide is prescribed when an FDA-approved medication, dose, or dosage form is unavailable (ie. Nationwide shortage or no obesity coverage for GLP-1 meds). Patients are aware of the difference between these medications.  Cost of these medications is  dollars monthly based on dose.      Diagnoses and all orders for this visit:    Benign essential HTN    Pre-diabetes    Encounter for therapeutic drug monitoring    Obesity (BMI 30-39.9)          Olvin Ro MD

## 2024-04-15 NOTE — PATIENT INSTRUCTIONS
[facility]  [address], [address]  [place] [address]    Phone  Phone: [phone]    Fax  Fax: [phone]    Hours  Pharmacy: Mon - Fri 7:30AM - 7:30PM    Call Center: Mon - Fri 7:00AM - 7:00PM

## 2024-05-24 ENCOUNTER — TELEPHONE (OUTPATIENT)
Dept: FAMILY MEDICINE CLINIC | Facility: CLINIC | Age: 44
End: 2024-05-24

## 2024-05-24 NOTE — TELEPHONE ENCOUNTER
See encounter from 03/06/2024 Dr. Mix changed patients dose from 10 mg to 20 mg.   Medication List  As of 3/6/2024 11:25 AM  Lisinopril 20 mg Oral Daily    Refill not appropriate

## 2024-05-29 DIAGNOSIS — I10 BENIGN ESSENTIAL HTN: ICD-10-CM

## 2024-06-01 RX ORDER — LISINOPRIL 20 MG/1
20 TABLET ORAL DAILY
Qty: 90 TABLET | Refills: 3 | OUTPATIENT
Start: 2024-06-01 | End: 2025-05-27

## 2024-06-10 DIAGNOSIS — E66.9 OBESITY (BMI 30-39.9): ICD-10-CM

## 2024-06-10 RX ORDER — PHENTERMINE HYDROCHLORIDE 30 MG/1
30 CAPSULE ORAL EVERY MORNING
Qty: 30 CAPSULE | Refills: 5 | Status: SHIPPED | OUTPATIENT
Start: 2024-06-10 | End: 2024-07-10

## 2024-08-29 ENCOUNTER — LAB ENCOUNTER (OUTPATIENT)
Dept: LAB | Age: 44
End: 2024-08-29
Payer: MEDICAID

## 2024-08-29 ENCOUNTER — OFFICE VISIT (OUTPATIENT)
Dept: INTERNAL MEDICINE CLINIC | Facility: CLINIC | Age: 44
End: 2024-08-29
Payer: MEDICAID

## 2024-08-29 VITALS
SYSTOLIC BLOOD PRESSURE: 148 MMHG | DIASTOLIC BLOOD PRESSURE: 100 MMHG | WEIGHT: 251 LBS | HEIGHT: 68.1 IN | HEART RATE: 71 BPM | OXYGEN SATURATION: 98 % | BODY MASS INDEX: 38.04 KG/M2

## 2024-08-29 DIAGNOSIS — Z87.898 HISTORY OF PREDIABETES: Primary | ICD-10-CM

## 2024-08-29 DIAGNOSIS — I10 BENIGN ESSENTIAL HTN: ICD-10-CM

## 2024-08-29 DIAGNOSIS — Z87.898 HISTORY OF PREDIABETES: ICD-10-CM

## 2024-08-29 DIAGNOSIS — Z00.00 ENCOUNTER FOR MEDICAL EXAMINATION TO ESTABLISH CARE: ICD-10-CM

## 2024-08-29 LAB
EST. AVERAGE GLUCOSE BLD GHB EST-MCNC: 111 MG/DL (ref 68–126)
HBA1C MFR BLD: 5.5 % (ref ?–5.7)

## 2024-08-29 PROCEDURE — 99204 OFFICE O/P NEW MOD 45 MIN: CPT

## 2024-08-29 PROCEDURE — 36415 COLL VENOUS BLD VENIPUNCTURE: CPT

## 2024-08-29 PROCEDURE — 83036 HEMOGLOBIN GLYCOSYLATED A1C: CPT

## 2024-08-29 RX ORDER — PHENTERMINE HYDROCHLORIDE 30 MG/1
CAPSULE ORAL
COMMUNITY
Start: 2024-08-20

## 2024-08-29 NOTE — PATIENT INSTRUCTIONS
Mayra de huey metformin     Por favor chequea le presion por bettie semana diario y pierre un mensaje con lisa numeros para fransico si tenemos que cambiar la dosis de lisinopril.

## 2024-08-29 NOTE — PROGRESS NOTES
Subjective:   Guzman Rodirguez is a 44 year old male who presents for Establish Care     Presents to establish care    Last A1c value was 5.5% done 3/6/2024. However they were not told if he should continue the metformin at this time. Upon chart review he was only pre-diabetic not diabetic.     Also wondering about his lisinopril   Sometimes feels ill and checks blood pressure and it is 120/80 and then he eats something or drinks a coke and he feels better  Sometimes goes up to 148 systolic   Denies headaches, dizziness, chest pain, vision changes  He does not check blood sugars at home    Came back from [place] and had a headache for 2-3 days but then it passed     History/Other:    Chief Complaint Reviewed and Verified  No Further Nursing Notes to   Review  Tobacco Reviewed  Allergies Reviewed  Medications Reviewed    Problem List Reviewed  Medical History Reviewed  Surgical History   Reviewed  Family History Reviewed         Tobacco:  He has never smoked tobacco.    Current Outpatient Medications   Medication Sig Dispense Refill    Phentermine HCl 30 MG Oral Cap       lisinopril 20 MG Oral Tab Take 1 tablet (20 mg total) by mouth daily. 90 tablet 3    metFORMIN  MG Oral Tablet 24 Hr Take 1 tablet (500 mg total) by mouth daily with breakfast. 90 tablet 3    Blood Pressure Does not apply Kit To check daily 1 kit 0    CUSTOM MEDICATION Semaglutide/ cyanocobalamin    0.25 mg-   concentration: 1 /0.5 mg/ ML  Amount:  1 Ml vial  Instructions: inject 25 units/ 0.25 ML q weekly (Patient not taking: Reported on 8/29/2024) 1 each 3         Review of Systems:  Review of Systems  10 point review of systems otherwise negative with the exception of HPI and assessment and plan    Objective:   BP (!) 148/100   Pulse 71   Ht 5' 8.1\" (1.73 m)   Wt 251 lb (113.9 kg)   SpO2 98%   BMI 38.05 kg/m²  Estimated body mass index is 38.05 kg/m² as calculated from the following:    Height as of this encounter: 5' 8.1\" (1.73  archana).    Weight as of this encounter: 251 lb (113.9 kg).  Physical Exam  Vitals reviewed.   Constitutional:       General: He is not in acute distress.     Appearance: Normal appearance. He is well-developed.   Cardiovascular:      Rate and Rhythm: Normal rate and regular rhythm.      Heart sounds: Normal heart sounds.   Pulmonary:      Effort: Pulmonary effort is normal.      Breath sounds: Normal breath sounds.   Skin:     General: Skin is warm and dry.   Neurological:      Mental Status: He is alert and oriented to person, place, and time.       Assessment & Plan:   1. History of prediabetes (Primary)  Stop metformin   Last A1c value was 5.5% done 3/6/2024.  They would like to check A1c now since it has been 6 months since last check, then will stop metformin and recheck in 3 months to ensure it is not rising again   2. Benign essential HTN  Overview:  12/9 Home blood pressure checked and verified for accuracy.  3. Encounter for medical examination to establish care  Other orders  -     Hemoglobin A1C; Future; Expected date: 11/29/2024      RAINER Stoner, 8/29/2024, 9:22 AM

## 2024-10-07 ENCOUNTER — OFFICE VISIT (OUTPATIENT)
Dept: SURGERY | Facility: CLINIC | Age: 44
End: 2024-10-07
Payer: MEDICAID

## 2024-10-07 VITALS
OXYGEN SATURATION: 99 % | HEART RATE: 69 BPM | WEIGHT: 252 LBS | BODY MASS INDEX: 38.19 KG/M2 | SYSTOLIC BLOOD PRESSURE: 130 MMHG | HEIGHT: 68.1 IN | DIASTOLIC BLOOD PRESSURE: 82 MMHG

## 2024-10-07 DIAGNOSIS — I10 BENIGN ESSENTIAL HTN: Primary | ICD-10-CM

## 2024-10-07 DIAGNOSIS — Z51.81 ENCOUNTER FOR THERAPEUTIC DRUG MONITORING: ICD-10-CM

## 2024-10-07 DIAGNOSIS — R73.03 PRE-DIABETES: ICD-10-CM

## 2024-10-07 DIAGNOSIS — E66.9 OBESITY (BMI 30-39.9): ICD-10-CM

## 2024-10-07 DIAGNOSIS — F43.9 STRESS: ICD-10-CM

## 2024-10-07 PROCEDURE — 99214 OFFICE O/P EST MOD 30 MIN: CPT | Performed by: INTERNAL MEDICINE

## 2024-10-07 RX ORDER — PHENTERMINE HYDROCHLORIDE 30 MG/1
30 CAPSULE ORAL EVERY MORNING
Qty: 30 CAPSULE | Refills: 5 | Status: SHIPPED | OUTPATIENT
Start: 2024-10-07 | End: 2024-11-06

## 2024-10-07 NOTE — PROGRESS NOTES
[facility], [facility], [place]  [address]  [facility] [address]  Dept: [phone]       Patient:  Guzman Rodriguez  :      1980  MRN:      [id number]    Chief Complaint:    Chief Complaint   Patient presents with    Follow - Up    Weight Management       SUBJECTIVE     History of Present Illness:  Guzman is being seen today for a follow-up for non surgical weight loss    Past Medical History:   Past Medical History:    Dyslipidemia    Fatty liver    HTN (hypertension), benign    Morbid obesity with BMI of 40.0-44.9, adult (HCC)    Pre-diabetes        Comorbidities:  SOB/AUGUSTE-Improvement?  yes, Back pain-Improvement?  yes, Joint pain-Improvement?  yes, Hypertension-Improvement?  yes, LEN-Improvement?  yes, and Snoring-Improvement?  yes    OBJECTIVE     Vitals: /82   Pulse 69   Ht 5' 8.1\" (1.73 m)   Wt 252 lb (114.3 kg)   SpO2 99%   BMI 38.20 kg/m²     Initial weight loss: +05   Total weight loss: -22   Start weight: 274    Wt Readings from Last 3 Encounters:   10/07/24 252 lb (114.3 kg)   24 251 lb (113.9 kg)   04/15/24 247 lb 6.4 oz (112.2 kg)       Patient Medications:    Current Outpatient Medications   Medication Sig Dispense Refill    Phentermine HCl 30 MG Oral Cap       CUSTOM MEDICATION Semaglutide/ cyanocobalamin    0.25 mg-   concentration: 1 /0.5 mg/ ML  Amount:  1 Ml vial  Instructions: inject 25 units/ 0.25 ML q weekly (Patient not taking: Reported on 2024) 1 each 3    lisinopril 20 MG Oral Tab Take 1 tablet (20 mg total) by mouth daily. 90 tablet 3    metFORMIN  MG Oral Tablet 24 Hr Take 1 tablet (500 mg total) by mouth daily with breakfast. 90 tablet 3    Blood Pressure Does not apply Kit To check daily 1 kit 0     Allergies:  Patient has no known allergies.     Social History:    Social History     Socioeconomic History    Marital status:      Spouse name: Not on file    Number of children: Not on file     Years of education: Not on file    Highest education level: Not on file   Occupational History    Not on file   Tobacco Use    Smoking status: Never     Passive exposure: Never    Smokeless tobacco: Never   Vaping Use    Vaping status: Never Used   Substance and Sexual Activity    Alcohol use: Yes     Alcohol/week: 21.0 standard drinks of alcohol     Types: 21 Cans of beer per week     Comment: 2-3 beers per day    Drug use: Never    Sexual activity: Not on file   Other Topics Concern    Not on file   Social History Narrative    Not on file     Social Determinants of Health     Financial Resource Strain: Not on file   Food Insecurity: Not on file   Transportation Needs: Not on file   Physical Activity: Not on file   Stress: Not on file   Social Connections: Not on file   Housing Stability: Not on file     Surgical History:  No past surgical history on file.  Family History:    Family History   Problem Relation Age of Onset    Diabetes Mother     Hypertension Mother     Colon Cancer Neg     Prostate Cancer Neg        Food Journal  Reviewed and Discussed:       Patient has a Food Journal?: yes   Patient is reading nutrition labels?  yes  Average Caloric Intake:     Average CHO Intake: 120  Is patient exercising? no  Type of exercise?     Eating Habits  Patient states the following:  Eats 2 meal(s) per day  Length of time it takes to consume a meal:  20  # of snacks per day: 1 Type of snacks:  fruit  Amount of soda consumption per day:  1  Amount of water (in ounces) per day:  64  Drinking between meals only:  yes  Toughest challenge:  exercise    Nutritional Goals  Limit carbohydrates to 100 gms per day, Eat 100-200 calories within 1 hour of waking , and Eat 3-4 cups of fresh fruits or vegetables daily    Behavior Modifications Reviewed and Discussed  Eat breakfast, Eat 3 meals per day, Plan meals in advance, Read nutrition labels, Drink 64 oz of water per day, Maintain a daily food journal, No drinking 30 minutes  before or after meals, Utlize portion control strategies to reduce calorie intake, Identify triggers for eating and manage cues, and Eat slowly and take 20 to 30 minutes to complete each meal    Exercise Goals Reviewed and Discussed    Needs to ambulate    ROS:    Constitutional: negative  Respiratory: negative  Cardiovascular: negative  Gastrointestinal: negative  Musculoskeletal:negative  Neurological: negative  Behavioral/Psych: negative  Endocrine: negative  All other systems were reviewed and are negative    Physical Exam:   General appearance: alert, appears stated age, cooperative, and morbidly obese  Head: Normocephalic, without obvious abnormality, atraumatic  Back: symmetric, no curvature. ROM normal. No CVA tenderness.  Lungs: clear to auscultation bilaterally  Heart: S1, S2 normal, no murmur, click, rub or gallop, regular rate and rhythm  Abdomen:  firm, obese, non tendder  Extremities: extremities normal, atraumatic, no cyanosis or edema  Pulses: 2+ and symmetric  Skin: Skin color, texture, turgor normal. No rashes or lesions  Neurologic: Grossly normal    ASSESSMENT     HYPERTENSION:  The patient's blood pressure has been well controlled.  he has been checking it as instructed and has remained in relatively good control.    HYPERCHOLESTEROLEMIA:  The patient states that his cholesterol has been well controlled on his current medication.    Lab Results   Component Value Date/Time    CHOLEST 185 03/06/2024 11:47 AM     (H) 03/06/2024 11:47 AM    HDL 34 (L) 03/06/2024 11:47 AM    TRIG 115 03/06/2024 11:47 AM    VLDL 21 03/06/2024 11:47 AM       Encounter Diagnosis(ses):   Encounter Diagnoses   Name Primary?    Benign essential HTN Yes    Stress     Pre-diabetes     Encounter for therapeutic drug monitoring     Obesity (BMI 30-39.9)        PLAN   Given the patient's age, degree of obesity and multiple medical problems outlined above, I do believe that bariatric surgery may prove beneficial if the  patient is unable to lose at least 20% of his weight after 6 months time. Further consideration for bariatric surgery will be discussed at upcoming clinic visits.        HYPERTENSION: Blood pressure stable on the above medications. No interval change in antihypertensive medication.     DYSLIPIDEMIA: Stable on the above prescribed meal plan and medication. Liver function stable.    Lab Results   Component Value Date/Time    CHOLEST 185 03/06/2024 11:47 AM     (H) 03/06/2024 11:47 AM    HDL 34 (L) 03/06/2024 11:47 AM    TRIG 115 03/06/2024 11:47 AM    VLDL 21 03/06/2024 11:47 AM       Goals for next month:  1. Keep a food log.  2. Drink 48-64 ounces of non-caloric beverages per day. No fruit juices or regular soda.  3. Increase activity-upper body exercises, walk 10 minutes per day.  4. Increase fruit and vegetable servings to 5-6 per day.      Tolerating Phentermine  Refill at current dose  EKG done    Has good support from wife    Needs to ambulate    Tried Semiglutide: made him more hungry  Will send Tirzepatide to [facility] at next visit       Diagnoses and all orders for this visit:    Benign essential HTN    Stress    Pre-diabetes    Encounter for therapeutic drug monitoring    Obesity (BMI 30-39.9)          Olvin Ro MD

## 2025-01-13 ENCOUNTER — OFFICE VISIT (OUTPATIENT)
Dept: ORTHOPEDICS CLINIC | Facility: CLINIC | Age: 45
End: 2025-01-13
Payer: MEDICAID

## 2025-01-13 VITALS — BODY MASS INDEX: 38.19 KG/M2 | HEIGHT: 68.1 IN | WEIGHT: 252 LBS

## 2025-01-13 DIAGNOSIS — M17.12 PRIMARY OSTEOARTHRITIS OF LEFT KNEE: Primary | ICD-10-CM

## 2025-01-13 DIAGNOSIS — M23.204 OLD COMPLEX TEAR OF MEDIAL MENISCUS OF LEFT KNEE: ICD-10-CM

## 2025-01-13 RX ORDER — TRIAMCINOLONE ACETONIDE 40 MG/ML
40 INJECTION, SUSPENSION INTRA-ARTICULAR; INTRAMUSCULAR ONCE
Status: COMPLETED | OUTPATIENT
Start: 2025-01-13 | End: 2025-01-13

## 2025-01-13 RX ORDER — PHENTERMINE HYDROCHLORIDE 30 MG/1
30 CAPSULE ORAL
COMMUNITY
Start: 2024-11-27

## 2025-01-13 RX ADMIN — TRIAMCINOLONE ACETONIDE 40 MG: 40 INJECTION, SUSPENSION INTRA-ARTICULAR; INTRAMUSCULAR at 14:25:00

## 2025-01-13 NOTE — PROGRESS NOTES
formerly [facility] Orthopaedic Follow-up    Chief Complaint:  Chronic recurrent left knee pain    History: The patient is a 44 year old male presenting with his wife for reassessment of his left knee.  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  Pain is limited to the medial aspect of his left knee with mild stiffness but no instability.  The patient therefore underwent an MRI on 5/22/2023 which identified abnormality in the medial meniscus and significant arthritic change.  He elected to treat his knee nonsurgically.  Corticosteroid injection provided in June of 2023 and April 2024 were highly beneficial.  Symptoms have now returned with prolonged standing, squatting and pivoting activities.  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    Past Medical History:    Dyslipidemia    Fatty liver    HTN (hypertension), benign    Morbid obesity with BMI of 40.0-44.9, adult (HCC)    Pre-diabetes     History reviewed. No pertinent surgical history.  Current Outpatient Medications   Medication Sig Dispense Refill    Phentermine HCl 30 MG Oral Cap 1 capsule (30 mg total).      lisinopril 20 MG Oral Tab Take 1 tablet (20 mg total) by mouth daily. 90 tablet 3     No Known Allergies  Family History   Problem Relation Age of Onset    Diabetes Mother     Hypertension Mother     Colon Cancer Neg     Prostate Cancer Neg      Social History     Occupational History    Not on file   Tobacco Use    Smoking status: Never     Passive exposure: Never    Smokeless tobacco: Never   Vaping Use    Vaping status: Never Used   Substance and Sexual Activity    Alcohol use: Yes     Alcohol/week: 21.0 standard drinks of alcohol     Types: 21 Cans of beer per week     Comment: 2-3 beers per day    Drug use: Never    Sexual activity: Not on file        ROS:  Complete ROS reviewed by me and non-contributory to the chief complaint except as mentioned above.        Physical Exam:    Ht 5' 8.1\" (1.73 m)   Wt 252 lb (114.3 kg)   BMI 38.20 kg/m²   Constitutional: Well developed, well nourished 44 year old male presenting with his wife and son  Psychological: NAD, alert and appropriate  Respiratory: Breathing comfortably on room air with RR of 10-14  Cardiac: Palpable distal pulses with pink warm extremities  Left knee exam:  On examination there is no significant swelling and no discoloration about the knee.  There is medial joint line tenderness with a painful Fior's test.  Lateral joint line is less tender.  Knee ligaments are stable on varus valgus stress with solid endpoints.  Lachman and posterior drawer are intact.  Range of motion testing demonstrates discomfort on passive hyperflexion.  There is no distal edema about the foot and ankle with intact motor, sensory and perfusion.        Imaging Results:      MRI KNEE, LEFT (CBY=89266)    Result Date: 5/23/2023  CONCLUSION:  1. Complex degenerative tear posterior horn and body of medial meniscus with a radial component at the posterior horn root.  Loss of meniscal hoop tension. 2. Stress related subchondral marrow edema in the medial tibial plateau.  No discrete fracture. 3. Mild-to-moderate irregular articular cartilage thinning medial tibial plateau. 4. Small knee joint effusion with reactive synovitis.     Dictated by (CST): Willis Cabral MD on 5/23/2023 at 12:03 PM     Finalized by (CST): Willis Cabral MD on 5/23/2023 at 12:12 PM      Misha- remote    MRI and plain x-rays from 5/3/2023 left knee personally viewed, independently interpreted and radiology report read.    Aside from the above there is some trace arthritic narrowing at the medial aspect of this left knee.          Assessment: Diagnoses and all orders for this visit:    Diagnoses and all orders for this visit:    Primary osteoarthritis of left knee  -     DRAIN/INJECT LARGE JOINT/BURSA  -     triamcinolone acetonide (Kenalog-40) 40 MG/ML injection 40  mg    Old complex tear of medial meniscus of left knee      Plan: We reviewed imaging and exam findings with the patient.  The patient returns with his wife of the current symptoms of pain at the medial aspect likely related to his meniscus.  There are some early arthritic changes which are contributing to his pain as well.  Given his busy work schedule and required recovery time, he is not amenable to pursuing any surgical intervention at this time.  We had a long discussion regarding the nature of this diagnosis and treatment options.  This includes continued conservative care with activity protection, repeat injections versus minimally invasive management with arthroscopy.  I reiterated to the patient and his wife that arthroscopy may be the more definitive treatment option given the recurrent nature of his symptoms.  For now, they inquire about a repeat corticosteroid injection instead.  I feel this is reasonable but if symptoms do not respond he may follow-up for a more in-depth discussion on arthroscopic treatment options.  All questions were answered and they verbalized understanding and appreciation.      Knee Cortisone Injection Procedure:  After discussing risks, benefits and alternatives to cortisone injection including but not limited to needle infection, steroid flare, temporary elevation in blood sugars in diabetic patients or failed improvement, the patient gave written consent to proceed.  Using meticulous sterile technique, we injected 40 mg of Kenalog mixed with 4 cc of 1% Xylocaine at the lateral patellofemoral joint of the left knee to minimal resistance.  The patient tolerated this well and a Band-Aid was applied.  Instructions were given to contact us if the patient experiences any adverse effects.    Claudine Ngo MD, FAAOS  Orthopaedic Surgery   Sports Medicine/Knee and Shoulder  [facility]  t: [phone]  f: [phone]           This document  was partially prepared using Dragon Medical voice recognition software.  Although every attempt is made to correct errors during dictation, discrepancies may still exist.

## 2025-01-27 ENCOUNTER — OFFICE VISIT (OUTPATIENT)
Dept: FAMILY MEDICINE CLINIC | Facility: CLINIC | Age: 45
End: 2025-01-27
Payer: MEDICAID

## 2025-01-27 VITALS
HEART RATE: 85 BPM | WEIGHT: 236 LBS | HEIGHT: 68.1 IN | DIASTOLIC BLOOD PRESSURE: 79 MMHG | SYSTOLIC BLOOD PRESSURE: 123 MMHG | BODY MASS INDEX: 35.77 KG/M2

## 2025-01-27 DIAGNOSIS — R10.13 DYSPEPSIA: ICD-10-CM

## 2025-01-27 DIAGNOSIS — Z23 IMMUNIZATION DUE: ICD-10-CM

## 2025-01-27 DIAGNOSIS — R73.03 PREDIABETES: Primary | ICD-10-CM

## 2025-01-27 DIAGNOSIS — E66.9 OBESITY (BMI 35.0-39.9 WITHOUT COMORBIDITY): ICD-10-CM

## 2025-01-27 LAB — HEMOGLOBIN A1C: 5.1 % (ref 4.3–5.6)

## 2025-01-27 PROCEDURE — 83036 HEMOGLOBIN GLYCOSYLATED A1C: CPT | Performed by: STUDENT IN AN ORGANIZED HEALTH CARE EDUCATION/TRAINING PROGRAM

## 2025-01-27 PROCEDURE — 99214 OFFICE O/P EST MOD 30 MIN: CPT | Performed by: STUDENT IN AN ORGANIZED HEALTH CARE EDUCATION/TRAINING PROGRAM

## 2025-01-27 PROCEDURE — 90471 IMMUNIZATION ADMIN: CPT | Performed by: STUDENT IN AN ORGANIZED HEALTH CARE EDUCATION/TRAINING PROGRAM

## 2025-01-27 PROCEDURE — 90656 IIV3 VACC NO PRSV 0.5 ML IM: CPT | Performed by: STUDENT IN AN ORGANIZED HEALTH CARE EDUCATION/TRAINING PROGRAM

## 2025-01-27 RX ORDER — PHENTERMINE HYDROCHLORIDE 30 MG/1
30 CAPSULE ORAL EVERY MORNING
Qty: 30 CAPSULE | Refills: 3 | Status: SHIPPED | OUTPATIENT
Start: 2025-01-27

## 2025-01-27 RX ORDER — PANTOPRAZOLE SODIUM 40 MG/1
40 TABLET, DELAYED RELEASE ORAL
Qty: 90 TABLET | Refills: 3 | Status: SHIPPED | OUTPATIENT
Start: 2025-01-27

## 2025-01-27 NOTE — PROGRESS NOTES
HPI:    Patient ID: Guzman Rodriguez is a 44 year old male.    HPI  Pt presenting for follow-up. Thai translation assistance per wife, present for visit.    H/o prediabetes  Has been taking compounded Mounjaro, current dosing 10mg  Has lost approx 20lbs  Reports increased heartburn    H/o HTN  Stopped taking Lisinopril over the last week  Home BP readings wnl  Denies chest pain, palpitations, dizziness      Review of Systems   A comprehensive 10 point review of systems was completed.  Pertinent positives and negatives noted in the the HPI.       Current Outpatient Medications   Medication Sig Dispense Refill    Phentermine HCl 30 MG Oral Cap Take 1 capsule (30 mg total) by mouth every morning. 30 capsule 3    pantoprazole 40 MG Oral Tab EC Take 1 tablet (40 mg total) by mouth every morning before breakfast. 90 tablet 3    lisinopril 20 MG Oral Tab Take 1 tablet (20 mg total) by mouth daily. 90 tablet 3     Allergies:Allergies[1]   Vitals:    01/27/25 1148   BP: 123/79   Pulse: 85   Weight: 236 lb (107 kg)   Height: 5' 8.1\" (1.73 m)       Body mass index is 35.78 kg/m².   PHYSICAL EXAM:   Physical Exam  Vitals reviewed.   Constitutional:       General: He is not in acute distress.     Appearance: Normal appearance.   HENT:      Head: Normocephalic and atraumatic.      Right Ear: External ear normal.      Left Ear: External ear normal.   Eyes:      Conjunctiva/sclera: Conjunctivae normal.   Cardiovascular:      Rate and Rhythm: Normal rate and regular rhythm.      Heart sounds: Normal heart sounds, S1 normal and S2 normal. No murmur heard.  Pulmonary:      Effort: Pulmonary effort is normal. No respiratory distress.      Breath sounds: Normal breath sounds. No wheezing, rhonchi or rales.   Abdominal:      General: Bowel sounds are normal.      Palpations: Abdomen is soft.      Tenderness: There is no abdominal tenderness. There is no guarding or rebound.   Musculoskeletal:      Cervical back: Normal range of motion and  neck supple. No muscular tenderness.      Right lower leg: No edema.      Left lower leg: No edema.   Lymphadenopathy:      Cervical: No cervical adenopathy.   Skin:     General: Skin is warm.      Coloration: Skin is not jaundiced.   Neurological:      General: No focal deficit present.      Mental Status: He is alert and oriented to person, place, and time. Mental status is at baseline.   Psychiatric:         Attention and Perception: Attention normal.         Mood and Affect: Mood normal.         Behavior: Behavior normal. Behavior is cooperative.         Cognition and Memory: Cognition normal.             ASSESSMENT/PLAN:   1. Prediabetes  POC A1c 5.1, improved from last 5.5 Aug 2024  - discussed healthy diet and lifestyle changes for overall wellness, which includes decreased carb and sugar intake, increased fiber intake, and increased water intake as tolerated, as well as regular exercise  - counseled on increased protein intake, goal 20-30g/meal  - goal moderate-intensity activity 30min daily / 150min per week  - POC Glycohemoglobin [53984]    2. Dyspepsia  Suspect related to GLP1 dosing  - discussed dietary modifications including avoiding caffeine, sodas, NSAID use, EtOH, and late night eating  - to increase water intake as able  - will start PPI trial  - to call with any worsened symptoms  - pantoprazole 40 MG Oral Tab EC; Take 1 tablet (40 mg total) by mouth every morning before breakfast.  Dispense: 90 tablet; Refill: 3    3. Obesity (BMI 35.0-39.9 without comorbidity)  - discussed healthy diet and lifestyle changes for overall wellness, which includes decreased carb and sugar intake, increased fiber intake, and increased water intake as tolerated, as well as regular exercise  - will continue phentermine dosing  I have reviewed the [place] Prescription Monitoring Program. This patient is appropriate for Phentermine refill.  - Phentermine HCl 30 MG Oral Cap; Take 1 capsule (30 mg total) by mouth every  morning.  Dispense: 30 capsule; Refill: 3    4. Immunization due  - Fluzone trivalent vaccine, PF 0.5mL, 6mo+ (64453)    Pt verbalized understanding and agrees with plan.    Orders Placed This Encounter   Procedures    POC Glycohemoglobin [10326]    Fluzone trivalent vaccine, PF 0.5mL, 6mo+ (35196)       Meds This Visit:  Requested Prescriptions     Signed Prescriptions Disp Refills    Phentermine HCl 30 MG Oral Cap 30 capsule 3     Sig: Take 1 capsule (30 mg total) by mouth every morning.    pantoprazole 40 MG Oral Tab EC 90 tablet 3     Sig: Take 1 tablet (40 mg total) by mouth every morning before breakfast.       Imaging & Referrals:  INFLUENZA VACCINE, TRI, PRESERV FREE, 0.5 ML         ID#[id number]         [1] No Known Allergies

## 2025-04-09 NOTE — PROGRESS NOTES
[facility], [facility], [place]  [address] [facility] [address] [place] [id number]  Dept: [phone]       Patient:  Guzman Rodriguez  :      1980  MRN:      [id number]    Chief Complaint:    Chief Complaint   Patient presents with    Follow - Up    Weight Management       SUBJECTIVE     History of Present Illness:  Guzman is being seen today for a follow-up for non surgical weight loss    Past Medical History:   Past Medical History:    Dyslipidemia    Fatty liver    HTN (hypertension), benign    Morbid obesity with BMI of 40.0-44.9, adult (HCC)    Pre-diabetes        Comorbidities:  SOB/AUGUSTE-Improvement?  yes, Back pain-Improvement?  yes, Joint pain-Improvement?  yes, Hypertension-Improvement?  yes, LEN-Improvement?  yes, and Snoring-Improvement?  yes    OBJECTIVE     Vitals: /88   Pulse 70   Resp 16   Ht 5' 8.1\" (1.73 m)   Wt 241 lb (109.3 kg)   SpO2 98%   BMI 36.54 kg/m²     Initial weight loss: -11   Total weight loss: -33   Start weight: 274    Wt Readings from Last 3 Encounters:   25 241 lb (109.3 kg)   25 236 lb (107 kg)   25 252 lb (114.3 kg)       Patient Medications:    Current Outpatient Medications   Medication Sig Dispense Refill    CUSTOM MEDICATION Tirzepatide/Niacinamide Injection        17/2 mg/mL  4 mL    Inject  12  mg subcutaneously into abdomen weekly 1 each 5    Phentermine HCl 30 MG Oral Cap Take 1 capsule (30 mg total) by mouth every morning. 30 capsule 3    pantoprazole 40 MG Oral Tab EC Take 1 tablet (40 mg total) by mouth every morning before breakfast. 90 tablet 3     Allergies:  Patient has no known allergies.     Social History:    Social History     Socioeconomic History    Marital status:      Spouse name: Not on file    Number of children: Not on file    Years of education: Not on file    Highest education level: Not on file   Occupational History    Not on file   Tobacco Use    Smoking status:  Never     Passive exposure: Never    Smokeless tobacco: Never   Vaping Use    Vaping status: Never Used   Substance and Sexual Activity    Alcohol use: Yes     Alcohol/week: 21.0 standard drinks of alcohol     Types: 21 Cans of beer per week     Comment: 2-3 beers per day    Drug use: Never    Sexual activity: Not on file   Other Topics Concern    Not on file   Social History Narrative    Not on file     Social Drivers of Health     Food Insecurity: Not on file   Transportation Needs: Not on file   Stress: Not on file   Housing Stability: Not on file     Surgical History:  No past surgical history on file.  Family History:    Family History   Problem Relation Age of Onset    Diabetes Mother         Diabetes    Hypertension Mother     Colon Cancer Neg     Prostate Cancer Neg        Food Journal  Reviewed and Discussed:       Patient has a Food Journal?: yes   Patient is reading nutrition labels?  yes  Average Caloric Intake:     Average CHO Intake: 120  Is patient exercising? yes  Type of exercise?     Eating Habits  Patient states the following:  Eats 2 meal(s) per day  Length of time it takes to consume a meal:  20  # of snacks per day: 1 Type of snacks:  fruit  Amount of soda consumption per day:  1  Amount of water (in ounces) per day:  64  Drinking between meals only:  yes  Toughest challenge:  exercise    Nutritional Goals  Limit carbohydrates to 100 gms per day, Eat 100-200 calories within 1 hour of waking , and Eat 3-4 cups of fresh fruits or vegetables daily    Behavior Modifications Reviewed and Discussed  Eat breakfast, Eat 3 meals per day, Plan meals in advance, Read nutrition labels, Drink 64 oz of water per day, Maintain a daily food journal, No drinking 30 minutes before or after meals, Utlize portion control strategies to reduce calorie intake, Identify triggers for eating and manage cues, and Eat slowly and take 20 to 30 minutes to complete each meal    Exercise Goals Reviewed and Discussed    Needs  to ambulate    ROS:    Constitutional: negative  Respiratory: negative  Cardiovascular: negative  Gastrointestinal: negative  Musculoskeletal:negative  Neurological: negative  Behavioral/Psych: negative  Endocrine: negative  All other systems were reviewed and are negative    Physical Exam:   General appearance: alert, appears stated age, cooperative, and morbidly obese  Head: Normocephalic, without obvious abnormality, atraumatic  Back: symmetric, no curvature. ROM normal. No CVA tenderness.  Lungs: clear to auscultation bilaterally  Heart: S1, S2 normal, no murmur, click, rub or gallop, regular rate and rhythm  Abdomen:  firm, obese, non tendder  Extremities: extremities normal, atraumatic, no cyanosis or edema  Pulses: 2+ and symmetric  Skin: Skin color, texture, turgor normal. No rashes or lesions  Neurologic: Grossly normal    ASSESSMENT     HYPERTENSION:  The patient's blood pressure has been well controlled.  he has been checking it as instructed and has remained in relatively good control.    HYPERCHOLESTEROLEMIA:  The patient states that his cholesterol has been well controlled on his current medication.    Lab Results   Component Value Date/Time    CHOLEST 185 03/06/2024 11:47 AM     (H) 03/06/2024 11:47 AM    HDL 34 (L) 03/06/2024 11:47 AM    TRIG 115 03/06/2024 11:47 AM    VLDL 21 03/06/2024 11:47 AM       Encounter Diagnosis(ses):   Encounter Diagnoses   Name Primary?    Benign essential HTN Yes    Stress     Pre-diabetes     Obesity (BMI 30-39.9)     Encounter for therapeutic drug monitoring        PLAN   Given the patient's age, degree of obesity and multiple medical problems outlined above, I do believe that bariatric surgery may prove beneficial if the patient is unable to lose at least 20% of his weight after 6 months time. Further consideration for bariatric surgery will be discussed at upcoming clinic visits.        HYPERTENSION: Blood pressure stable on the above medications. No interval  change in antihypertensive medication.     DYSLIPIDEMIA: Stable on the above prescribed meal plan and medication. Liver function stable.    Lab Results   Component Value Date/Time    CHOLEST 185 03/06/2024 11:47 AM     (H) 03/06/2024 11:47 AM    HDL 34 (L) 03/06/2024 11:47 AM    TRIG 115 03/06/2024 11:47 AM    VLDL 21 03/06/2024 11:47 AM       Goals for next month:  1. Keep a food log.  2. Drink 48-64 ounces of non-caloric beverages per day. No fruit juices or regular soda.  3. Increase activity-upper body exercises, walk 10 minutes per day.  4. Increase fruit and vegetable servings to 5-6 per day.      Tolerating Phentermine  Refill at current dose  EKG due    Has good support from wife    Needs to ambulate    Tried Semiglutide: made him more hungry  Will send Tirzepatide 12 mg to [facility]       Diagnoses and all orders for this visit:    Benign essential HTN    Stress    Pre-diabetes    Obesity (BMI 30-39.9)  -     CUSTOM MEDICATION; Tirzepatide/Niacinamide Injection        17/2 mg/mL  4 mL    Inject  12  mg subcutaneously into abdomen weekly    Encounter for therapeutic drug monitoring          Olvin Ro MD

## 2025-04-28 NOTE — TELEPHONE ENCOUNTER
Year supply of refills good until 01/27/2026:    Outpatient Medication Detail     Disp Refills Start End    pantoprazole 40 MG Oral Tab EC 90 tablet 3 1/27/2025 --    Sig - Route: Take 1 tablet (40 mg total) by mouth every morning before breakfast. - Oral    Sent to pharmacy as: Pantoprazole Sodium 40 MG Oral Tablet Delayed Release (Protonix)    E-Prescribing Status: Receipt confirmed by pharmacy (1/27/2025 12:18 PM CST)      Associated Diagnoses    Dyspepsia        Pharmacy    [id number] [place] [place] - [address] AT [facility] [address], [phone], [phone]

## 2025-06-09 NOTE — PROGRESS NOTES
HPI:      Patient ID: Guzman Rodriguez is a 44 year old male.    HPI  Pt presenting for follow-up. Wife present for visit, providing Jordanian translation assistance per pt's request.    H/o HTN  H/o prediabetes  H/o GERD  H/o obesity  Was taking compounded Mounjaro but recent lapse and subsequent weight regain  Has not been taking Lisinopril  Taking Phentermine 30mg with some benefit  Endorses worsened snoring and fatigue  Also reports some vision changes      Review of Systems   A comprehensive 10 point review of systems was completed.  Pertinent positives and negatives noted in the the HPI.     Current Medications[1]  Allergies:Allergies[2]   Vitals:    06/09/25 1114   BP: 147/84   Pulse: 72   SpO2: 97%   Weight: 253 lb 6.4 oz (114.9 kg)   Height: 5' 8.1\" (1.73 m)       Body mass index is 38.42 kg/m².   PHYSICAL EXAM:   Physical Exam  Vitals reviewed.   Constitutional:       General: He is not in acute distress.     Appearance: Normal appearance.   HENT:      Head: Normocephalic and atraumatic.      Right Ear: External ear normal.      Left Ear: External ear normal.   Eyes:      Conjunctiva/sclera: Conjunctivae normal.   Cardiovascular:      Rate and Rhythm: Normal rate and regular rhythm.      Heart sounds: Normal heart sounds, S1 normal and S2 normal. No murmur heard.  Pulmonary:      Effort: Pulmonary effort is normal. No respiratory distress.      Breath sounds: Normal breath sounds. No wheezing, rhonchi or rales.   Abdominal:      General: Bowel sounds are normal.      Palpations: Abdomen is soft.   Musculoskeletal:      Cervical back: Normal range of motion and neck supple. No muscular tenderness.      Right lower leg: No edema.      Left lower leg: No edema.   Lymphadenopathy:      Cervical: No cervical adenopathy.   Skin:     General: Skin is warm.      Coloration: Skin is not jaundiced.   Neurological:      General: No focal deficit present.      Mental Status: He is alert and oriented to person,  place, and time. Mental status is at baseline.   Psychiatric:         Attention and Perception: Attention normal.         Mood and Affect: Mood normal.         Behavior: Behavior normal. Behavior is cooperative.         Cognition and Memory: Cognition normal.             ASSESSMENT/PLAN:   1. Snoring  Discussed role of weight loss  Will check sleep study  - discussed red flags for urgent reevaluation  - DIAGOSTIC SLEEP STUDY  - General sleep study; Future    2. Obesity (BMI 35.0-39.9 without comorbidity)  - discussed treatment options, including lifestyle medicine, bariatrics, medication -- pt lifestyle medicine at this time  - discussed healthy diet and lifestyle changes for overall wellness, which includes decreased carb and sugar intake, increased fiber intake, and increased water intake as tolerated, as well as regular exercise  - will check labs  - will check CTC for risk stratification  - will uptitrate Phentermine and monitor response  - discussed red flags for urgent reevaluation  - CT CALCIUM SCORING; Future  - TSH W Reflex To Free T4; Future  - Comp Metabolic Panel (14); Future  - Hemoglobin A1C; Future  - Phentermine HCl 37.5 MG Oral Tab; Take 1 tablet (37.5 mg total) by mouth every morning before breakfast.  Dispense: 30 tablet; Refill: 0    3. Benign essential HTN  In-office BP elevated  - discussed benefits of DASH diet and daily activity  - continue BP monitoring  - restart daily medication  - will check labwork  - advised to call if BP is persistently elevated  - lisinopril 20 MG Oral Tab; Take 1 tablet (20 mg total) by mouth daily.  Dispense: 90 tablet; Refill: 3  - Lipid Panel; Future  - CBC With Differential With Platelet; Future  - Urinalysis, Routine; Future    4. Vision changes  - Ophthalmology Referral - In Network    Pt verbalized understanding and agrees with plan.    Orders Placed This Encounter   Procedures    TSH W Reflex To Free T4    Comp Metabolic Panel (14)    Hemoglobin A1C    Lipid  Panel    CBC With Differential With Platelet    Urinalysis, Routine       Meds This Visit:  Requested Prescriptions     Signed Prescriptions Disp Refills    lisinopril 20 MG Oral Tab 90 tablet 3     Sig: Take 1 tablet (20 mg total) by mouth daily.    Phentermine HCl 37.5 MG Oral Tab 30 tablet 0     Sig: Take 1 tablet (37.5 mg total) by mouth every morning before breakfast.       Imaging & Referrals:  [facility] REFERRAL DIAGOSTIC SLEEP STUDY ADULT  OPHTHALMOLOGY - INTERNAL  CT CALCIUM SCORING         ID#[id number]         [1]   Current Outpatient Medications   Medication Sig Dispense Refill    lisinopril 20 MG Oral Tab Take 1 tablet (20 mg total) by mouth daily. 90 tablet 3    Phentermine HCl 37.5 MG Oral Tab Take 1 tablet (37.5 mg total) by mouth every morning before breakfast. 30 tablet 0    CUSTOM MEDICATION Tirzepatide/Niacinamide Injection        17/2 mg/mL  4 mL    Inject  12  mg subcutaneously into abdomen weekly (Patient not taking: Reported on 6/9/2025) 1 each 5    pantoprazole 40 MG Oral Tab EC Take 1 tablet (40 mg total) by mouth every morning before breakfast. (Patient not taking: Reported on 6/9/2025) 90 tablet 3   [2] No Known Allergies

## 2025-07-16 NOTE — TELEPHONE ENCOUNTER
Please review; protocol failed/ has no protocol      Recent fills: 06/09/2025  Last Rx written: 06/09/2025  Last Office Visit: 06/09/2025    Recent Visits  Date Type Provider Dept   06/09/25 Office Visit Minna Mix MD [facility]

## (undated) NOTE — Clinical Note
Thanks for the referral He already saw you for weight loss, so I am going to count that as his first visit  Insurance requires 7 visits before surgery You did #1 I did number #2 and #3 You can see him aug, sept and oct  Ill see him at the last one in Nov  Will start Diethylpropion  He should get a sleep study  Thanks  Summer Osorio